# Patient Record
Sex: FEMALE | Race: WHITE | HISPANIC OR LATINO | Employment: UNEMPLOYED
[De-identification: names, ages, dates, MRNs, and addresses within clinical notes are randomized per-mention and may not be internally consistent; named-entity substitution may affect disease eponyms.]

---

## 2017-05-04 ENCOUNTER — HOSPITAL (OUTPATIENT)
Dept: OTHER | Age: 32
End: 2017-05-04
Attending: EMERGENCY MEDICINE

## 2017-05-04 LAB
ALBUMIN SERPL-MCNC: 3.7 GM/DL (ref 3.6–5.1)
ALBUMIN/GLOB SERPL: 1 {RATIO} (ref 1–2.4)
ALP SERPL-CCNC: 55 UNIT/L (ref 45–117)
ALT SERPL-CCNC: 26 UNIT/L
ANALYZER ANC (IANC): NORMAL
ANION GAP SERPL CALC-SCNC: 13 MMOL/L (ref 10–20)
APTT PPP: 23 SECONDS (ref 22–30)
APTT PPP: NORMAL S
AST SERPL-CCNC: 19 UNIT/L
BASOPHILS # BLD: 0 THOUSAND/MCL (ref 0–0.3)
BASOPHILS NFR BLD: 0 %
BILIRUB SERPL-MCNC: 0.6 MG/DL (ref 0.2–1)
BUN SERPL-MCNC: 5 MG/DL (ref 6–20)
BUN/CREAT SERPL: 10 (ref 7–25)
CALCIUM SERPL-MCNC: 9.4 MG/DL (ref 8.4–10.2)
CHLORIDE: 106 MMOL/L (ref 98–107)
CO2 SERPL-SCNC: 23 MMOL/L (ref 21–32)
CREAT SERPL-MCNC: 0.5 MG/DL (ref 0.51–0.95)
DIFFERENTIAL METHOD BLD: NORMAL
EOSINOPHIL # BLD: 0.1 THOUSAND/MCL (ref 0.1–0.5)
EOSINOPHIL NFR BLD: 1 %
ERYTHROCYTE [DISTWIDTH] IN BLOOD: 14.3 % (ref 11–15)
GLOBULIN SER-MCNC: 3.7 GM/DL (ref 2–4)
GLUCOSE SERPL-MCNC: 102 MG/DL (ref 65–99)
HCG SERPL-ACNC: ABNORMAL MUNIT/ML
HEMATOCRIT: 39.6 % (ref 36–46.5)
HGB BLD-MCNC: 13.6 GM/DL (ref 12–15.5)
INR PPP: 0.9
LIPASE SERPL-CCNC: 212 UNIT/L (ref 73–393)
LYMPHOCYTES # BLD: 1.4 THOUSAND/MCL (ref 1–4.8)
LYMPHOCYTES NFR BLD: 26 %
MCH RBC QN AUTO: 30.3 PG (ref 26–34)
MCHC RBC AUTO-ENTMCNC: 34.3 GM/DL (ref 32–36.5)
MCV RBC AUTO: 88.2 FL (ref 78–100)
MONOCYTES # BLD: 0.3 THOUSAND/MCL (ref 0.3–0.9)
MONOCYTES NFR BLD: 6 %
NEUTROPHILS # BLD: 3.5 THOUSAND/MCL (ref 1.8–7.7)
NEUTROPHILS NFR BLD: 67 %
NEUTS SEG NFR BLD: NORMAL %
PERCENT NRBC: NORMAL
PLATELET # BLD: 238 THOUSAND/MCL (ref 140–450)
POTASSIUM SERPL-SCNC: 3.6 MMOL/L (ref 3.4–5.1)
PROT SERPL-MCNC: 7.4 GM/DL (ref 6.4–8.2)
PROTHROMBIN TIME: 9.9 SECONDS (ref 9.7–11.8)
PROTHROMBIN TIME: NORMAL
RBC # BLD: 4.49 MILLION/MCL (ref 4–5.2)
SODIUM SERPL-SCNC: 138 MMOL/L (ref 135–145)
WBC # BLD: 5.3 THOUSAND/MCL (ref 4.2–11)

## 2020-01-15 LAB
CYTOLOGY CVX/VAG DOC THIN PREP: NORMAL
HPV16+18+45 E6+E7MRNA CVX NAA+PROBE: NEGATIVE

## 2021-01-07 ENCOUNTER — WALK IN (OUTPATIENT)
Dept: URGENT CARE | Age: 36
End: 2021-01-07

## 2021-01-07 ENCOUNTER — TELEPHONE (OUTPATIENT)
Dept: SCHEDULING | Age: 36
End: 2021-01-07

## 2021-01-07 VITALS
RESPIRATION RATE: 18 BRPM | DIASTOLIC BLOOD PRESSURE: 62 MMHG | HEART RATE: 80 BPM | TEMPERATURE: 99.7 F | SYSTOLIC BLOOD PRESSURE: 110 MMHG | OXYGEN SATURATION: 98 %

## 2021-01-07 DIAGNOSIS — J06.9 ACUTE UPPER RESPIRATORY INFECTION, UNSPECIFIED: ICD-10-CM

## 2021-01-07 DIAGNOSIS — Z20.822 SUSPECTED COVID-19 VIRUS INFECTION: Primary | ICD-10-CM

## 2021-01-07 LAB — SARS-COV-2 AG RESP QL IA.RAPID: NOT DETECTED

## 2021-01-07 PROCEDURE — 87426 SARSCOV CORONAVIRUS AG IA: CPT | Performed by: INTERNAL MEDICINE

## 2021-01-07 PROCEDURE — 99203 OFFICE O/P NEW LOW 30 MIN: CPT | Performed by: INTERNAL MEDICINE

## 2021-01-07 RX ORDER — CEFDINIR 300 MG/1
300 CAPSULE ORAL 2 TIMES DAILY
Qty: 20 CAPSULE | Refills: 0 | Status: SHIPPED | OUTPATIENT
Start: 2021-01-07 | End: 2021-01-17

## 2021-02-02 ENCOUNTER — OFFICE VISIT (OUTPATIENT)
Dept: FAMILY MEDICINE | Age: 36
End: 2021-02-02

## 2021-02-02 VITALS
HEIGHT: 65 IN | DIASTOLIC BLOOD PRESSURE: 60 MMHG | TEMPERATURE: 98.1 F | BODY MASS INDEX: 28.32 KG/M2 | HEART RATE: 84 BPM | RESPIRATION RATE: 16 BRPM | WEIGHT: 170 LBS | SYSTOLIC BLOOD PRESSURE: 100 MMHG

## 2021-02-02 DIAGNOSIS — R79.89 ELEVATED LFTS: ICD-10-CM

## 2021-02-02 DIAGNOSIS — L50.9 HIVES: ICD-10-CM

## 2021-02-02 DIAGNOSIS — F10.90 HEAVY ALCOHOL USE: ICD-10-CM

## 2021-02-02 DIAGNOSIS — K21.9 GASTROESOPHAGEAL REFLUX DISEASE, UNSPECIFIED WHETHER ESOPHAGITIS PRESENT: ICD-10-CM

## 2021-02-02 DIAGNOSIS — J30.89 NON-SEASONAL ALLERGIC RHINITIS, UNSPECIFIED TRIGGER: ICD-10-CM

## 2021-02-02 DIAGNOSIS — K63.5 POLYP OF COLON, UNSPECIFIED PART OF COLON, UNSPECIFIED TYPE: ICD-10-CM

## 2021-02-02 DIAGNOSIS — F41.1 GAD (GENERALIZED ANXIETY DISORDER): ICD-10-CM

## 2021-02-02 DIAGNOSIS — Z00.00 LABORATORY EXAMINATION ORDERED AS PART OF A ROUTINE GENERAL MEDICAL EXAMINATION: ICD-10-CM

## 2021-02-02 DIAGNOSIS — Z00.01 ENCOUNTER FOR GENERAL ADULT MEDICAL EXAMINATION WITH ABNORMAL FINDINGS: Primary | ICD-10-CM

## 2021-02-02 PROCEDURE — 99204 OFFICE O/P NEW MOD 45 MIN: CPT | Performed by: FAMILY MEDICINE

## 2021-02-02 PROCEDURE — 96127 BRIEF EMOTIONAL/BEHAV ASSMT: CPT | Performed by: FAMILY MEDICINE

## 2021-02-02 PROCEDURE — 99385 PREV VISIT NEW AGE 18-39: CPT | Performed by: FAMILY MEDICINE

## 2021-02-02 RX ORDER — FLUTICASONE PROPIONATE 50 MCG
SPRAY, SUSPENSION (ML) NASAL
Qty: 1 BOTTLE | Refills: 2 | Status: SHIPPED | OUTPATIENT
Start: 2021-02-02

## 2021-02-02 ASSESSMENT — PATIENT HEALTH QUESTIONNAIRE - PHQ9
CLINICAL INTERPRETATION OF PHQ2 SCORE: NO FURTHER SCREENING NEEDED
2. FEELING DOWN, DEPRESSED OR HOPELESS: NOT AT ALL
1. LITTLE INTEREST OR PLEASURE IN DOING THINGS: NOT AT ALL
SUM OF ALL RESPONSES TO PHQ9 QUESTIONS 1 AND 2: 0
SUM OF ALL RESPONSES TO PHQ9 QUESTIONS 1 AND 2: 0
CLINICAL INTERPRETATION OF PHQ9 SCORE: NO FURTHER SCREENING NEEDED

## 2021-02-02 ASSESSMENT — ANXIETY QUESTIONNAIRES
5. BEING SO RESTLESS THAT IT IS HARD TO SIT STILL: 1
3. WORRYING TOO MUCH ABOUT DIFFERENT THINGS: 1
6. BECOMING EASILY ANNOYED OR IRRITABLE: MORE THAN HALF THE DAYS
7. FEELING AFRAID AS IF SOMETHING AWFUL MIGHT HAPPEN: NOT AT ALL
4. TROUBLE RELAXING: 2
4. TROUBLE RELAXING: MORE THAN HALF THE DAYS
7. FEELING AFRAID AS IF SOMETHING AWFUL MIGHT HAPPEN: 0
1. FEELING NERVOUS, ANXIOUS, OR ON EDGE: SEVERAL DAYS
6. BECOMING EASILY ANNOYED OR IRRITABLE: 2
5. BEING SO RESTLESS THAT IT IS HARD TO SIT STILL: SEVERAL DAYS
1. FEELING NERVOUS, ANXIOUS, OR ON EDGE: 1
2. NOT BEING ABLE TO STOP OR CONTROL WORRYING: SEVERAL DAYS
3. WORRYING TOO MUCH ABOUT DIFFERENT THINGS: SEVERAL DAYS
GAD7 TOTAL SCORE: 8
2. NOT BEING ABLE TO STOP OR CONTROL WORRYING: 1

## 2021-02-09 ENCOUNTER — TELEPHONE (OUTPATIENT)
Dept: FAMILY MEDICINE | Age: 36
End: 2021-02-09

## 2021-02-09 DIAGNOSIS — R79.89 LOW VITAMIN D LEVEL: Primary | ICD-10-CM

## 2021-02-25 ENCOUNTER — OFFICE VISIT (OUTPATIENT)
Dept: ALLERGY | Age: 36
End: 2021-02-25

## 2021-02-25 ENCOUNTER — LAB SERVICES (OUTPATIENT)
Dept: LAB | Age: 36
End: 2021-02-25

## 2021-02-25 VITALS
WEIGHT: 170 LBS | TEMPERATURE: 97.2 F | HEART RATE: 72 BPM | BODY MASS INDEX: 28.32 KG/M2 | DIASTOLIC BLOOD PRESSURE: 60 MMHG | SYSTOLIC BLOOD PRESSURE: 110 MMHG | HEIGHT: 65 IN

## 2021-02-25 DIAGNOSIS — Z91.018 FOOD ALLERGY: ICD-10-CM

## 2021-02-25 DIAGNOSIS — Z91.018 FOOD ALLERGY: Primary | ICD-10-CM

## 2021-02-25 DIAGNOSIS — H10.10 ALLERGIC RHINOCONJUNCTIVITIS: ICD-10-CM

## 2021-02-25 DIAGNOSIS — J30.9 ALLERGIC RHINOCONJUNCTIVITIS: ICD-10-CM

## 2021-02-25 PROCEDURE — 36415 COLL VENOUS BLD VENIPUNCTURE: CPT | Performed by: NURSE PRACTITIONER

## 2021-02-25 PROCEDURE — 99244 OFF/OP CNSLTJ NEW/EST MOD 40: CPT | Performed by: NURSE PRACTITIONER

## 2021-02-25 PROCEDURE — 82785 ASSAY OF IGE: CPT | Performed by: NURSE PRACTITIONER

## 2021-02-25 PROCEDURE — 95004 PERQ TESTS W/ALRGNC XTRCS: CPT | Performed by: NURSE PRACTITIONER

## 2021-02-25 PROCEDURE — 86003 ALLG SPEC IGE CRUDE XTRC EA: CPT | Performed by: NURSE PRACTITIONER

## 2021-02-25 PROCEDURE — 86008 ALLG SPEC IGE RECOMB EA: CPT | Performed by: NURSE PRACTITIONER

## 2021-02-25 RX ORDER — EPINEPHRINE 0.3 MG/.3ML
0.3 INJECTION SUBCUTANEOUS PRN
Qty: 2 EACH | Refills: 0 | Status: SHIPPED | OUTPATIENT
Start: 2021-02-25

## 2021-02-25 ASSESSMENT — ENCOUNTER SYMPTOMS
SHORTNESS OF BREATH: 0
SINUS PRESSURE: 0
SLEEP DISTURBANCE: 0
APPETITE CHANGE: 0
NERVOUS/ANXIOUS: 0
HEADACHES: 0
ABDOMINAL PAIN: 0
WHEEZING: 0
DIARRHEA: 0
CHEST TIGHTNESS: 0
FACIAL SWELLING: 0
FEVER: 0
COUGH: 0
VOMITING: 0
SORE THROAT: 0
ADENOPATHY: 0

## 2021-02-27 LAB
ALMOND IGE QN: 1.29 KU/L (ref 0–0.1)
BRAZIL NUT IGE QN: <0.1 KU/L (ref 0–0.1)
CASHEW NUT IGE QN: <0.1 KU/L (ref 0–0.1)
F425 COR A 8: 29.1 KU/L (ref 0–0.1)
F428 COR A 1: <0.1 KU/L (ref 0–0.1)
F439 COR A 14: <0.1 KU/L (ref 0–0.1)
F440 COR A 9: <0.1 KU/L (ref 0–0.1)
HAZELNUT IGE QN: 14.6 KU/L (ref 0–0.1)
MACADAMIA IGE QN: 9.69 KU/L (ref 0–0.1)
PEANUT (RARA H) 1 IGE QN: <0.1 KU/L (ref 0–0.1)
PEANUT (RARA H) 2 IGE QN: <0.1 KU/L (ref 0–0.1)
PEANUT (RARA H) 3 IGE QN: <0.1 KU/L (ref 0–0.1)
PEANUT (RARA H) 8 IGE QN: <0.1 KU/L (ref 0–0.1)
PEANUT (RARA H) 9 IGE QN: 5.68 KU/L (ref 0–0.1)
PEANUT (RARA H) 9 IGE QN: <0.1 KU/L (ref 0–0.1)
PEANUT IGE QN: 6.93 KU/L (ref 0–0.1)
PECAN/HICK NUT IGE QN: 0.32 KU/L (ref 0–0.1)
PINE NUT IGE QN: <0.1 KU/L (ref 0–0.1)
PISTACHIO IGE QN: <0.1 KU/L (ref 0–0.1)
TOTAL IGE SMQN RAST: 327 KU/L (ref 0–100)
WALNUT IGE QN: 14 KU/L (ref 0–0.1)

## 2021-03-01 ENCOUNTER — TELEPHONE (OUTPATIENT)
Dept: BEHAVIORAL HEALTH | Age: 36
End: 2021-03-01

## 2021-03-08 ENCOUNTER — APPOINTMENT (OUTPATIENT)
Dept: FAMILY MEDICINE | Age: 36
End: 2021-03-08

## 2021-04-14 ENCOUNTER — V-VISIT (OUTPATIENT)
Dept: BEHAVIORAL HEALTH | Age: 36
End: 2021-04-14
Attending: FAMILY MEDICINE

## 2021-04-14 DIAGNOSIS — F41.1 GENERALIZED ANXIETY DISORDER: Primary | ICD-10-CM

## 2021-04-14 DIAGNOSIS — F33.1 MODERATE EPISODE OF RECURRENT MAJOR DEPRESSIVE DISORDER (CMD): ICD-10-CM

## 2021-04-14 DIAGNOSIS — F10.20 ALCOHOL USE DISORDER, MODERATE, DEPENDENCE (CMD): ICD-10-CM

## 2021-04-14 PROCEDURE — 90791 PSYCH DIAGNOSTIC EVALUATION: CPT | Performed by: SOCIAL WORKER

## 2021-04-21 ENCOUNTER — LAB SERVICES (OUTPATIENT)
Dept: LAB | Age: 36
End: 2021-04-21

## 2021-04-21 ENCOUNTER — E-ADVICE (OUTPATIENT)
Dept: FAMILY MEDICINE | Age: 36
End: 2021-04-21

## 2021-04-21 DIAGNOSIS — Z97.3 WEARS GLASSES: Primary | ICD-10-CM

## 2021-04-21 DIAGNOSIS — Z00.00 LABORATORY EXAMINATION ORDERED AS PART OF A ROUTINE GENERAL MEDICAL EXAMINATION: ICD-10-CM

## 2021-04-21 DIAGNOSIS — R79.89 LOW VITAMIN D LEVEL: ICD-10-CM

## 2021-04-21 LAB
25(OH)D3 SERPL-MCNC: 17.1 NG/ML (ref 30–100)
ALBUMIN SERPL-MCNC: 4.4 G/DL (ref 3.6–5.1)
ALP SERPL-CCNC: 66 U/L (ref 45–130)
ALT SERPL W/O P-5'-P-CCNC: 59 U/L (ref 4–38)
AST SERPL-CCNC: 40 U/L (ref 14–43)
BASOPHIL %: 0.6 % (ref 0–1.2)
BASOPHIL ABSOLUTE #: 0 10*3/UL (ref 0–0.1)
BILIRUB SERPL-MCNC: 0.8 MG/DL (ref 0–1.3)
BUN SERPL-MCNC: 10 MG/DL (ref 7–20)
CALCIUM SERPL-MCNC: 9.6 MG/DL (ref 8.6–10.6)
CHLORIDE SERPL-SCNC: 104 MMOL/L (ref 96–107)
CHOLEST SERPL-MCNC: 151 MG/DL (ref 140–200)
CO2 SERPL-SCNC: 27 MMOL/L (ref 22–32)
CREAT SERPL-MCNC: 0.8 MG/DL (ref 0.5–1.4)
DIFFERENTIAL TYPE: ABNORMAL
EOSINOPHIL %: 6.5 % (ref 0–10)
EOSINOPHIL ABSOLUTE #: 0.4 10*3/UL (ref 0–0.5)
GFR SERPL CREATININE-BSD FRML MDRD: >60 ML/MIN/{1.73M2}
GFR SERPL CREATININE-BSD FRML MDRD: >60 ML/MIN/{1.73M2}
GLUCOSE P FAST SERPL-MCNC: 90 MG/DL (ref 60–100)
HDLC SERPL-MCNC: 66 MG/DL
HEMATOCRIT: 45 % (ref 34–45)
HEMOGLOBIN: 14.6 G/DL (ref 11.2–15.7)
IMMATURE GRANULOCYTE ABSOLUTE: 0.02 10*3/UL (ref 0–0.05)
IMMATURE GRANULOCYTE PERCENT: 0.3 % (ref 0–0.5)
LDLC SERPL CALC-MCNC: 70 MG/DL (ref 30–100)
LYMPH PERCENT: 30 % (ref 20.5–51.1)
LYMPHOCYTE ABSOLUTE #: 1.9 10*3/UL (ref 1.2–3.4)
MEAN CORPUSCULAR HGB CONCENTRATION: 32.4 % (ref 32–36)
MEAN CORPUSCULAR HGB: 31.9 PG (ref 27–34)
MEAN CORPUSCULAR VOLUME: 98.3 FL (ref 79–95)
MEAN PLATELET VOLUME: 11.7 FL (ref 8.6–12.4)
MONOCYTE ABSOLUTE #: 0.5 10*3/UL (ref 0.2–0.9)
MONOCYTE PERCENT: 7.5 % (ref 4.3–12.9)
NEUTROPHIL ABSOLUTE #: 3.5 10*3/UL (ref 1.4–6.5)
NEUTROPHIL PERCENT: 55.1 % (ref 34–73.5)
PLATELET COUNT: 260 10*3/UL (ref 150–400)
POTASSIUM SERPL-SCNC: 4.5 MMOL/L (ref 3.5–5.3)
PROT SERPL-MCNC: 7.2 G/DL (ref 6.4–8.5)
RED BLOOD CELL COUNT: 4.58 10*6/UL (ref 3.7–5.2)
RED CELL DISTRIBUTION WIDTH: 12.9 % (ref 11.3–14.8)
SODIUM SERPL-SCNC: 142 MMOL/L (ref 136–146)
TRIGL SERPL-MCNC: 74 MG/DL (ref 0–200)
TSH SERPL DL<=0.05 MIU/L-ACNC: 2.14 M[IU]/L (ref 0.3–4.82)
WHITE BLOOD CELL COUNT: 6.3 10*3/UL (ref 4–10)

## 2021-04-21 PROCEDURE — 82306 VITAMIN D 25 HYDROXY: CPT | Performed by: FAMILY MEDICINE

## 2021-04-21 PROCEDURE — 80053 COMPREHEN METABOLIC PANEL: CPT | Performed by: FAMILY MEDICINE

## 2021-04-21 PROCEDURE — 84443 ASSAY THYROID STIM HORMONE: CPT | Performed by: FAMILY MEDICINE

## 2021-04-21 PROCEDURE — 85025 COMPLETE CBC W/AUTO DIFF WBC: CPT | Performed by: FAMILY MEDICINE

## 2021-04-21 PROCEDURE — 80061 LIPID PANEL: CPT | Performed by: FAMILY MEDICINE

## 2021-04-21 PROCEDURE — 36415 COLL VENOUS BLD VENIPUNCTURE: CPT | Performed by: FAMILY MEDICINE

## 2021-04-26 ENCOUNTER — APPOINTMENT (OUTPATIENT)
Dept: BEHAVIORAL HEALTH | Age: 36
End: 2021-04-26

## 2021-05-07 ENCOUNTER — TELEPHONE (OUTPATIENT)
Dept: BEHAVIORAL HEALTH | Age: 36
End: 2021-05-07

## 2021-05-07 ENCOUNTER — APPOINTMENT (OUTPATIENT)
Dept: BEHAVIORAL HEALTH | Age: 36
End: 2021-05-07

## 2021-05-11 ENCOUNTER — V-VISIT (OUTPATIENT)
Dept: BEHAVIORAL HEALTH | Age: 36
End: 2021-05-11

## 2021-05-11 ENCOUNTER — TELEPHONE (OUTPATIENT)
Dept: BEHAVIORAL HEALTH | Age: 36
End: 2021-05-11

## 2021-05-11 DIAGNOSIS — F10.20 ALCOHOL USE DISORDER, MODERATE, DEPENDENCE (CMD): ICD-10-CM

## 2021-05-11 DIAGNOSIS — F41.1 GENERALIZED ANXIETY DISORDER: Primary | ICD-10-CM

## 2021-05-11 DIAGNOSIS — F33.1 MODERATE EPISODE OF RECURRENT MAJOR DEPRESSIVE DISORDER (CMD): ICD-10-CM

## 2021-05-11 PROCEDURE — 90837 PSYTX W PT 60 MINUTES: CPT | Performed by: SOCIAL WORKER

## 2021-05-18 ENCOUNTER — APPOINTMENT (OUTPATIENT)
Dept: GASTROENTEROLOGY | Age: 36
End: 2021-05-18
Attending: FAMILY MEDICINE

## 2021-05-21 ENCOUNTER — APPOINTMENT (OUTPATIENT)
Dept: GASTROENTEROLOGY | Age: 36
End: 2021-05-21
Attending: FAMILY MEDICINE

## 2021-05-24 ENCOUNTER — BEHAVIORAL HEALTH (OUTPATIENT)
Dept: BEHAVIORAL HEALTH | Age: 36
End: 2021-05-24

## 2021-05-24 DIAGNOSIS — F33.1 MODERATE EPISODE OF RECURRENT MAJOR DEPRESSIVE DISORDER (CMD): ICD-10-CM

## 2021-05-24 DIAGNOSIS — F10.20 ALCOHOL USE DISORDER, MODERATE, DEPENDENCE (CMD): ICD-10-CM

## 2021-05-24 DIAGNOSIS — F41.1 GENERALIZED ANXIETY DISORDER: Primary | ICD-10-CM

## 2021-05-24 PROCEDURE — 90837 PSYTX W PT 60 MINUTES: CPT | Performed by: SOCIAL WORKER

## 2021-06-07 ENCOUNTER — APPOINTMENT (OUTPATIENT)
Dept: BEHAVIORAL HEALTH | Age: 36
End: 2021-06-07

## 2021-06-16 ENCOUNTER — APPOINTMENT (OUTPATIENT)
Dept: OPTOMETRY | Age: 36
End: 2021-06-16
Attending: FAMILY MEDICINE

## 2021-06-16 ENCOUNTER — OFFICE VISIT (OUTPATIENT)
Dept: OPHTHALMOLOGY | Age: 36
End: 2021-06-16

## 2021-06-16 ENCOUNTER — OFFICE VISIT (OUTPATIENT)
Dept: OPTOMETRY | Age: 36
End: 2021-06-16

## 2021-06-16 DIAGNOSIS — R68.89 SUSPECTED GLAUCOMA OF BOTH EYES: ICD-10-CM

## 2021-06-16 DIAGNOSIS — H40.003 PREGLAUCOMA, BILATERAL: ICD-10-CM

## 2021-06-16 DIAGNOSIS — H52.13 MYOPIA OF BOTH EYES: Primary | ICD-10-CM

## 2021-06-16 PROCEDURE — 92015 DETERMINE REFRACTIVE STATE: CPT | Performed by: OPTOMETRIST

## 2021-06-16 PROCEDURE — 92133 CPTRZD OPH DX IMG PST SGM ON: CPT | Performed by: OPTOMETRIST

## 2021-06-16 PROCEDURE — 92004 COMPRE OPH EXAM NEW PT 1/>: CPT | Performed by: OPTOMETRIST

## 2021-06-16 ASSESSMENT — EXTERNAL EXAM - RIGHT EYE: OD_EXAM: NORMAL

## 2021-06-16 ASSESSMENT — VISUAL ACUITY
OD_SC: 20/20
OS_SC: 20/20
OD_SC: JAEGER 1
METHOD: SNELLEN - LINEAR

## 2021-06-16 ASSESSMENT — REFRACTION_MANIFEST
OS_CYLINDER: SPHERE
OD_CYLINDER: SPHERE
OS_SPHERE: -1.00
OD_SPHERE: -1.00

## 2021-06-16 ASSESSMENT — TONOMETRY
OS_IOP_MMHG: 21
OD_IOP_MMHG: 22
IOP_METHOD: APPLANATION

## 2021-06-16 ASSESSMENT — CONF VISUAL FIELD
OS_NORMAL: 1
OD_NORMAL: 1

## 2021-06-16 ASSESSMENT — SLIT LAMP EXAM - LIDS
COMMENTS: NORMAL
COMMENTS: NORMAL

## 2021-06-16 ASSESSMENT — CUP TO DISC RATIO
OD_RATIO: 0.4
OS_RATIO: 0.4

## 2021-06-16 ASSESSMENT — EXTERNAL EXAM - LEFT EYE: OS_EXAM: NORMAL

## 2021-07-06 ENCOUNTER — TELEPHONE (OUTPATIENT)
Dept: OPTOMETRY | Age: 36
End: 2021-07-06

## 2021-07-07 ENCOUNTER — OFFICE VISIT (OUTPATIENT)
Dept: GASTROENTEROLOGY | Age: 36
End: 2021-07-07
Attending: FAMILY MEDICINE

## 2021-07-07 VITALS — WEIGHT: 170 LBS | BODY MASS INDEX: 28.32 KG/M2 | HEIGHT: 65 IN

## 2021-07-07 DIAGNOSIS — K21.9 GASTROESOPHAGEAL REFLUX DISEASE, UNSPECIFIED WHETHER ESOPHAGITIS PRESENT: Primary | ICD-10-CM

## 2021-07-07 DIAGNOSIS — Z86.010 PERSONAL HISTORY OF COLONIC POLYPS: ICD-10-CM

## 2021-07-07 DIAGNOSIS — R79.89 ELEVATED LFTS: ICD-10-CM

## 2021-07-07 DIAGNOSIS — K63.5 POLYP OF COLON, UNSPECIFIED PART OF COLON, UNSPECIFIED TYPE: ICD-10-CM

## 2021-07-07 DIAGNOSIS — K59.00 CONSTIPATION, UNSPECIFIED CONSTIPATION TYPE: ICD-10-CM

## 2021-07-07 DIAGNOSIS — K62.5 RECTUM BLEEDING: ICD-10-CM

## 2021-07-07 PROCEDURE — 99244 OFF/OP CNSLTJ NEW/EST MOD 40: CPT | Performed by: INTERNAL MEDICINE

## 2021-07-07 RX ORDER — SIMETHICONE 125 MG
TABLET,CHEWABLE ORAL
Qty: 2 TABLET | Refills: 0 | Status: SHIPPED | OUTPATIENT
Start: 2021-07-07 | End: 2021-08-21

## 2021-07-07 RX ORDER — BISACODYL 5 MG/1
TABLET, DELAYED RELEASE ORAL
Qty: 2 TABLET | Refills: 0 | Status: SHIPPED | OUTPATIENT
Start: 2021-07-07 | End: 2021-08-21

## 2021-07-07 RX ORDER — SODIUM, POTASSIUM,MAG SULFATES 17.5-3.13G
SOLUTION, RECONSTITUTED, ORAL ORAL
Qty: 1 KIT | Refills: 0 | Status: SHIPPED | OUTPATIENT
Start: 2021-07-07 | End: 2021-08-21

## 2021-08-08 ENCOUNTER — APPOINTMENT (OUTPATIENT)
Dept: URGENT CARE | Age: 36
End: 2021-08-08

## 2021-08-08 DIAGNOSIS — Z11.52 ENCOUNTER FOR PREPROCEDURE SCREENING LABORATORY TESTING FOR COVID-19: Primary | ICD-10-CM

## 2021-08-08 DIAGNOSIS — Z01.812 ENCOUNTER FOR PREPROCEDURE SCREENING LABORATORY TESTING FOR COVID-19: Primary | ICD-10-CM

## 2021-08-08 PROCEDURE — U0003 INFECTIOUS AGENT DETECTION BY NUCLEIC ACID (DNA OR RNA); SEVERE ACUTE RESPIRATORY SYNDROME CORONAVIRUS 2 (SARS-COV-2) (CORONAVIRUS DISEASE [COVID-19]), AMPLIFIED PROBE TECHNIQUE, MAKING USE OF HIGH THROUGHPUT TECHNOLOGIES AS DESCRIBED BY CMS-2020-01-R: HCPCS | Performed by: INTERNAL MEDICINE

## 2021-08-08 PROCEDURE — U0005 INFEC AGEN DETEC AMPLI PROBE: HCPCS | Performed by: INTERNAL MEDICINE

## 2021-08-09 LAB
SARS-COV-2 RNA RESP QL NAA+PROBE: NOT DETECTED
SERVICE CMNT-IMP: NORMAL
SERVICE CMNT-IMP: NORMAL

## 2021-08-10 ENCOUNTER — ANESTHESIA (OUTPATIENT)
Dept: GASTROENTEROLOGY | Age: 36
End: 2021-08-10

## 2021-08-10 ENCOUNTER — HOSPITAL ENCOUNTER (OUTPATIENT)
Dept: GASTROENTEROLOGY | Age: 36
Discharge: HOME | End: 2021-08-10
Attending: INTERNAL MEDICINE

## 2021-08-10 ENCOUNTER — ANESTHESIA EVENT (OUTPATIENT)
Dept: GASTROENTEROLOGY | Age: 36
End: 2021-08-10

## 2021-08-10 VITALS
HEART RATE: 72 BPM | HEIGHT: 65 IN | SYSTOLIC BLOOD PRESSURE: 125 MMHG | OXYGEN SATURATION: 97 % | DIASTOLIC BLOOD PRESSURE: 65 MMHG | BODY MASS INDEX: 28.32 KG/M2 | WEIGHT: 170 LBS | RESPIRATION RATE: 16 BRPM | TEMPERATURE: 97.5 F

## 2021-08-10 DIAGNOSIS — K31.89 OTHER DISEASES OF STOMACH AND DUODENUM: ICD-10-CM

## 2021-08-10 DIAGNOSIS — K21.9 GASTROESOPHAGEAL REFLUX DISEASE, UNSPECIFIED WHETHER ESOPHAGITIS PRESENT: Primary | ICD-10-CM

## 2021-08-10 DIAGNOSIS — K52.89 OTHER SPECIFIED NONINFECTIVE GASTROENTERITIS AND COLITIS: ICD-10-CM

## 2021-08-10 DIAGNOSIS — K62.5 HEMORRHAGE OF ANUS AND RECTUM: ICD-10-CM

## 2021-08-10 DIAGNOSIS — K62.5 RECTUM BLEEDING: ICD-10-CM

## 2021-08-10 DIAGNOSIS — D12.5 BENIGN NEOPLASM OF SIGMOID COLON: ICD-10-CM

## 2021-08-10 DIAGNOSIS — Z86.010 PERSONAL HISTORY OF COLONIC POLYPS: ICD-10-CM

## 2021-08-10 DIAGNOSIS — K21.9 GASTRO-ESOPHAGEAL REFLUX DISEASE WITHOUT ESOPHAGITIS: ICD-10-CM

## 2021-08-10 DIAGNOSIS — K20.80 OTHER ESOPHAGITIS WITHOUT BLEEDING: ICD-10-CM

## 2021-08-10 LAB
B-HCG UR QL: NEGATIVE
COLONOSCOPY STUDY: NORMAL
INTERNAL PROCEDURAL CONTROLS ACCEPTABLE: YES

## 2021-08-10 PROCEDURE — 88342 IMHCHEM/IMCYTCHM 1ST ANTB: CPT | Performed by: PATHOLOGY

## 2021-08-10 PROCEDURE — 45380 COLONOSCOPY AND BIOPSY: CPT | Performed by: CLINIC/CENTER

## 2021-08-10 PROCEDURE — 45385 COLONOSCOPY W/LESION REMOVAL: CPT | Performed by: INTERNAL MEDICINE

## 2021-08-10 PROCEDURE — 88342 IMHCHEM/IMCYTCHM 1ST ANTB: CPT | Performed by: CLINICAL MEDICAL LABORATORY

## 2021-08-10 PROCEDURE — 45380 COLONOSCOPY AND BIOPSY: CPT | Performed by: INTERNAL MEDICINE

## 2021-08-10 PROCEDURE — 43239 EGD BIOPSY SINGLE/MULTIPLE: CPT | Performed by: INTERNAL MEDICINE

## 2021-08-10 PROCEDURE — 45385 COLONOSCOPY W/LESION REMOVAL: CPT | Performed by: CLINIC/CENTER

## 2021-08-10 PROCEDURE — 43239 EGD BIOPSY SINGLE/MULTIPLE: CPT | Performed by: CLINIC/CENTER

## 2021-08-10 PROCEDURE — 88305 TISSUE EXAM BY PATHOLOGIST: CPT | Performed by: CLINICAL MEDICAL LABORATORY

## 2021-08-10 PROCEDURE — 88305 TISSUE EXAM BY PATHOLOGIST: CPT | Performed by: PATHOLOGY

## 2021-08-10 RX ORDER — SODIUM CHLORIDE, SODIUM LACTATE, POTASSIUM CHLORIDE, CALCIUM CHLORIDE 600; 310; 30; 20 MG/100ML; MG/100ML; MG/100ML; MG/100ML
INJECTION, SOLUTION INTRAVENOUS CONTINUOUS
Status: DISCONTINUED | OUTPATIENT
Start: 2021-08-10 | End: 2021-08-12 | Stop reason: HOSPADM

## 2021-08-10 RX ORDER — LIDOCAINE HYDROCHLORIDE 10 MG/ML
INJECTION, SOLUTION INFILTRATION; PERINEURAL PRN
Status: DISCONTINUED | OUTPATIENT
Start: 2021-08-10 | End: 2021-08-10

## 2021-08-10 RX ORDER — SODIUM CHLORIDE 9 MG/ML
INJECTION, SOLUTION INTRAVENOUS CONTINUOUS
Status: DISCONTINUED | OUTPATIENT
Start: 2021-08-10 | End: 2021-08-12 | Stop reason: HOSPADM

## 2021-08-10 RX ORDER — PROPOFOL 10 MG/ML
INJECTION, EMULSION INTRAVENOUS PRN
Status: DISCONTINUED | OUTPATIENT
Start: 2021-08-10 | End: 2021-08-10

## 2021-08-10 RX ORDER — LIDOCAINE HYDROCHLORIDE 10 MG/ML
5-10 INJECTION, SOLUTION INFILTRATION; PERINEURAL PRN
Status: DISCONTINUED | OUTPATIENT
Start: 2021-08-10 | End: 2021-08-12 | Stop reason: HOSPADM

## 2021-08-10 RX ORDER — ONDANSETRON 2 MG/ML
4 INJECTION INTRAMUSCULAR; INTRAVENOUS 2 TIMES DAILY PRN
Status: DISCONTINUED | OUTPATIENT
Start: 2021-08-10 | End: 2021-08-12 | Stop reason: HOSPADM

## 2021-08-10 RX ORDER — DEXTROSE MONOHYDRATE 50 MG/ML
INJECTION, SOLUTION INTRAVENOUS CONTINUOUS PRN
Status: DISCONTINUED | OUTPATIENT
Start: 2021-08-10 | End: 2021-08-12 | Stop reason: HOSPADM

## 2021-08-10 RX ADMIN — SODIUM CHLORIDE, SODIUM LACTATE, POTASSIUM CHLORIDE, CALCIUM CHLORIDE: 600; 310; 30; 20 INJECTION, SOLUTION INTRAVENOUS at 13:54

## 2021-08-10 RX ADMIN — PROPOFOL 50 MG: 10 INJECTION, EMULSION INTRAVENOUS at 14:13

## 2021-08-10 RX ADMIN — PROPOFOL 50 MG: 10 INJECTION, EMULSION INTRAVENOUS at 14:16

## 2021-08-10 RX ADMIN — PROPOFOL 100 MG: 10 INJECTION, EMULSION INTRAVENOUS at 14:11

## 2021-08-10 RX ADMIN — LIDOCAINE HYDROCHLORIDE 50 MG: 10 INJECTION, SOLUTION INFILTRATION; PERINEURAL at 14:11

## 2021-08-10 RX ADMIN — PROPOFOL 50 MG: 10 INJECTION, EMULSION INTRAVENOUS at 14:18

## 2021-08-10 ASSESSMENT — ACTIVITIES OF DAILY LIVING (ADL)
ADL_SCORE: 12
ADL_BEFORE_ADMISSION: INDEPENDENT
NEEDS_ASSIST: NO
HISTORY OF FALLING IN THE LAST YEAR (PRIOR TO ADMISSION): NO

## 2021-08-10 ASSESSMENT — PAIN SCALES - GENERAL: PAINLEVEL_OUTOF10: 0

## 2021-08-10 ASSESSMENT — ENCOUNTER SYMPTOMS: HEADACHES: 1

## 2021-08-11 ENCOUNTER — LAB REQUISITION (OUTPATIENT)
Dept: LAB | Age: 36
End: 2021-08-11

## 2021-08-11 DIAGNOSIS — Z86.010 PERSONAL HISTORY OF COLONIC POLYPS: ICD-10-CM

## 2021-08-11 DIAGNOSIS — K62.5 HEMORRHAGE OF ANUS AND RECTUM: ICD-10-CM

## 2021-08-11 DIAGNOSIS — K21.9 GASTRO-ESOPHAGEAL REFLUX DISEASE WITHOUT ESOPHAGITIS: ICD-10-CM

## 2021-08-14 LAB
CASE RPRT: NORMAL
PATH REPORT.FINAL DX SPEC: NORMAL

## 2021-08-16 LAB — AP REPORT: NORMAL

## 2021-08-17 RX ORDER — HYDROCORTISONE ACETATE 25 MG/1
25 SUPPOSITORY RECTAL NIGHTLY
Qty: 30 SUPPOSITORY | Refills: 0 | Status: SHIPPED | OUTPATIENT
Start: 2021-08-17 | End: 2021-09-16

## 2021-08-20 ENCOUNTER — WALK IN (OUTPATIENT)
Dept: URGENT CARE | Age: 36
End: 2021-08-20

## 2021-08-20 VITALS
SYSTOLIC BLOOD PRESSURE: 110 MMHG | TEMPERATURE: 97.6 F | DIASTOLIC BLOOD PRESSURE: 70 MMHG | OXYGEN SATURATION: 97 % | RESPIRATION RATE: 18 BRPM | HEART RATE: 67 BPM

## 2021-08-20 DIAGNOSIS — Z20.822 SUSPECTED COVID-19 VIRUS INFECTION: Primary | ICD-10-CM

## 2021-08-20 LAB — SARS-COV+SARS-COV-2 AG RESP QL IA.RAPID: NOT DETECTED

## 2021-08-20 PROCEDURE — 87426 SARSCOV CORONAVIRUS AG IA: CPT | Performed by: INTERNAL MEDICINE

## 2021-08-20 PROCEDURE — 99214 OFFICE O/P EST MOD 30 MIN: CPT | Performed by: INTERNAL MEDICINE

## 2021-10-26 ENCOUNTER — APPOINTMENT (OUTPATIENT)
Dept: URGENT CARE | Age: 36
End: 2021-10-26

## 2021-10-27 ENCOUNTER — WALK IN (OUTPATIENT)
Dept: URGENT CARE | Age: 36
End: 2021-10-27

## 2021-10-27 VITALS
SYSTOLIC BLOOD PRESSURE: 102 MMHG | HEART RATE: 95 BPM | OXYGEN SATURATION: 97 % | DIASTOLIC BLOOD PRESSURE: 64 MMHG | RESPIRATION RATE: 14 BRPM | TEMPERATURE: 98.8 F

## 2021-10-27 DIAGNOSIS — R50.9 FEVER, UNSPECIFIED FEVER CAUSE: ICD-10-CM

## 2021-10-27 DIAGNOSIS — R05.9 COUGH: ICD-10-CM

## 2021-10-27 DIAGNOSIS — U07.1 COVID-19 VIRUS INFECTION: Primary | ICD-10-CM

## 2021-10-27 LAB — SARS-COV+SARS-COV-2 AG RESP QL IA.RAPID: DETECTED

## 2021-10-27 PROCEDURE — 87426 SARSCOV CORONAVIRUS AG IA: CPT | Performed by: FAMILY MEDICINE

## 2021-10-27 PROCEDURE — 99213 OFFICE O/P EST LOW 20 MIN: CPT | Performed by: FAMILY MEDICINE

## 2021-11-02 ENCOUNTER — E-ADVICE (OUTPATIENT)
Dept: FAMILY MEDICINE | Age: 36
End: 2021-11-02

## 2021-11-09 ENCOUNTER — WALK IN (OUTPATIENT)
Dept: URGENT CARE | Age: 36
End: 2021-11-09

## 2021-11-09 ENCOUNTER — TELEPHONE (OUTPATIENT)
Dept: FAMILY MEDICINE | Age: 36
End: 2021-11-09

## 2021-11-09 VITALS
SYSTOLIC BLOOD PRESSURE: 110 MMHG | HEART RATE: 107 BPM | OXYGEN SATURATION: 94 % | RESPIRATION RATE: 22 BRPM | TEMPERATURE: 96.8 F | DIASTOLIC BLOOD PRESSURE: 72 MMHG

## 2021-11-09 DIAGNOSIS — R06.02 SHORTNESS OF BREATH: Primary | ICD-10-CM

## 2021-11-09 PROCEDURE — 99213 OFFICE O/P EST LOW 20 MIN: CPT | Performed by: FAMILY MEDICINE

## 2021-11-09 ASSESSMENT — ENCOUNTER SYMPTOMS
SHORTNESS OF BREATH: 1
COUGH: 1

## 2022-01-06 ENCOUNTER — OFFICE VISIT (OUTPATIENT)
Dept: INTERNAL MEDICINE | Age: 37
End: 2022-01-06

## 2022-01-06 VITALS
HEART RATE: 68 BPM | TEMPERATURE: 98.5 F | RESPIRATION RATE: 16 BRPM | BODY MASS INDEX: 27.69 KG/M2 | OXYGEN SATURATION: 100 % | DIASTOLIC BLOOD PRESSURE: 76 MMHG | SYSTOLIC BLOOD PRESSURE: 108 MMHG | WEIGHT: 166.4 LBS

## 2022-01-06 DIAGNOSIS — J30.9 ALLERGIC RHINOCONJUNCTIVITIS: Primary | ICD-10-CM

## 2022-01-06 DIAGNOSIS — H10.10 ALLERGIC RHINOCONJUNCTIVITIS: Primary | ICD-10-CM

## 2022-01-06 PROCEDURE — 99213 OFFICE O/P EST LOW 20 MIN: CPT | Performed by: INTERNAL MEDICINE

## 2022-01-06 RX ORDER — LORATADINE 10 MG/1
10 TABLET ORAL DAILY
Qty: 30 TABLET | Refills: 5 | Status: SHIPPED | OUTPATIENT
Start: 2022-01-06

## 2022-01-06 RX ORDER — ALBUTEROL SULFATE 90 UG/1
AEROSOL, METERED RESPIRATORY (INHALATION)
COMMUNITY
Start: 2021-11-09

## 2022-10-05 ENCOUNTER — OFFICE VISIT (OUTPATIENT)
Dept: FAMILY MEDICINE CLINIC | Facility: CLINIC | Age: 37
End: 2022-10-05
Payer: COMMERCIAL

## 2022-10-05 VITALS
DIASTOLIC BLOOD PRESSURE: 70 MMHG | HEART RATE: 70 BPM | BODY MASS INDEX: 27.14 KG/M2 | HEIGHT: 64 IN | WEIGHT: 159 LBS | SYSTOLIC BLOOD PRESSURE: 110 MMHG | TEMPERATURE: 98 F | OXYGEN SATURATION: 98 % | RESPIRATION RATE: 18 BRPM

## 2022-10-05 DIAGNOSIS — Z00.00 ANNUAL PHYSICAL EXAM: Primary | ICD-10-CM

## 2022-10-05 DIAGNOSIS — Z91.018 TREE NUT ALLERGY: ICD-10-CM

## 2022-10-05 PROCEDURE — 99385 PREV VISIT NEW AGE 18-39: CPT | Performed by: FAMILY MEDICINE

## 2022-10-05 PROCEDURE — 3078F DIAST BP <80 MM HG: CPT | Performed by: FAMILY MEDICINE

## 2022-10-05 PROCEDURE — 3008F BODY MASS INDEX DOCD: CPT | Performed by: FAMILY MEDICINE

## 2022-10-05 PROCEDURE — 3074F SYST BP LT 130 MM HG: CPT | Performed by: FAMILY MEDICINE

## 2022-10-05 RX ORDER — EPINEPHRINE 0.3 MG/.3ML
0.3 INJECTION SUBCUTANEOUS ONCE
COMMUNITY

## 2022-10-06 ENCOUNTER — PATIENT MESSAGE (OUTPATIENT)
Dept: FAMILY MEDICINE CLINIC | Facility: CLINIC | Age: 37
End: 2022-10-06

## 2022-10-06 ENCOUNTER — NURSE ONLY (OUTPATIENT)
Dept: FAMILY MEDICINE CLINIC | Facility: CLINIC | Age: 37
End: 2022-10-06
Payer: COMMERCIAL

## 2022-10-06 DIAGNOSIS — Z00.00 ANNUAL PHYSICAL EXAM: ICD-10-CM

## 2022-10-06 DIAGNOSIS — E55.9 VITAMIN D DEFICIENCY: Primary | ICD-10-CM

## 2022-10-06 DIAGNOSIS — L91.8 SKIN TAG: ICD-10-CM

## 2022-10-06 LAB
ALBUMIN SERPL-MCNC: 4.1 G/DL (ref 3.4–5)
ALBUMIN/GLOB SERPL: 1.2 {RATIO} (ref 1–2)
ALP LIVER SERPL-CCNC: 51 U/L
ALT SERPL-CCNC: 35 U/L
ANION GAP SERPL CALC-SCNC: 6 MMOL/L (ref 0–18)
AST SERPL-CCNC: 20 U/L (ref 15–37)
BASOPHILS # BLD AUTO: 0.04 X10(3) UL (ref 0–0.2)
BASOPHILS NFR BLD AUTO: 0.8 %
BILIRUB SERPL-MCNC: 0.9 MG/DL (ref 0.1–2)
BUN BLD-MCNC: 7 MG/DL (ref 7–18)
CALCIUM BLD-MCNC: 9.4 MG/DL (ref 8.5–10.1)
CHLORIDE SERPL-SCNC: 110 MMOL/L (ref 98–112)
CHOLEST SERPL-MCNC: 127 MG/DL (ref ?–200)
CO2 SERPL-SCNC: 25 MMOL/L (ref 21–32)
CREAT BLD-MCNC: 0.77 MG/DL
EOSINOPHIL # BLD AUTO: 0.13 X10(3) UL (ref 0–0.7)
EOSINOPHIL NFR BLD AUTO: 2.7 %
ERYTHROCYTE [DISTWIDTH] IN BLOOD BY AUTOMATED COUNT: 13.2 %
EST. AVERAGE GLUCOSE BLD GHB EST-MCNC: 94 MG/DL (ref 68–126)
FASTING PATIENT LIPID ANSWER: YES
FASTING STATUS PATIENT QL REPORTED: YES
GFR SERPLBLD BASED ON 1.73 SQ M-ARVRAT: 101 ML/MIN/1.73M2 (ref 60–?)
GLOBULIN PLAS-MCNC: 3.3 G/DL (ref 2.8–4.4)
GLUCOSE BLD-MCNC: 98 MG/DL (ref 70–99)
HBA1C MFR BLD: 4.9 % (ref ?–5.7)
HCT VFR BLD AUTO: 43.8 %
HDLC SERPL-MCNC: 56 MG/DL (ref 40–59)
HGB BLD-MCNC: 14.3 G/DL
IMM GRANULOCYTES # BLD AUTO: 0.01 X10(3) UL (ref 0–1)
IMM GRANULOCYTES NFR BLD: 0.2 %
LDLC SERPL CALC-MCNC: 62 MG/DL (ref ?–100)
LYMPHOCYTES # BLD AUTO: 1.31 X10(3) UL (ref 1–4)
LYMPHOCYTES NFR BLD AUTO: 27.1 %
MCH RBC QN AUTO: 31.4 PG (ref 26–34)
MCHC RBC AUTO-ENTMCNC: 32.6 G/DL (ref 31–37)
MCV RBC AUTO: 96.3 FL
MONOCYTES # BLD AUTO: 0.46 X10(3) UL (ref 0.1–1)
MONOCYTES NFR BLD AUTO: 9.5 %
NEUTROPHILS # BLD AUTO: 2.89 X10 (3) UL (ref 1.5–7.7)
NEUTROPHILS # BLD AUTO: 2.89 X10(3) UL (ref 1.5–7.7)
NEUTROPHILS NFR BLD AUTO: 59.7 %
NONHDLC SERPL-MCNC: 71 MG/DL (ref ?–130)
OSMOLALITY SERPL CALC.SUM OF ELEC: 290 MOSM/KG (ref 275–295)
PLATELET # BLD AUTO: 230 10(3)UL (ref 150–450)
POTASSIUM SERPL-SCNC: 4.1 MMOL/L (ref 3.5–5.1)
PROT SERPL-MCNC: 7.4 G/DL (ref 6.4–8.2)
RBC # BLD AUTO: 4.55 X10(6)UL
SODIUM SERPL-SCNC: 141 MMOL/L (ref 136–145)
TRIGL SERPL-MCNC: 36 MG/DL (ref 30–149)
TSI SER-ACNC: 1 MIU/ML (ref 0.36–3.74)
VLDLC SERPL CALC-MCNC: 5 MG/DL (ref 0–30)
WBC # BLD AUTO: 4.8 X10(3) UL (ref 4–11)

## 2022-10-06 PROCEDURE — 85025 COMPLETE CBC W/AUTO DIFF WBC: CPT | Performed by: FAMILY MEDICINE

## 2022-10-06 PROCEDURE — 83036 HEMOGLOBIN GLYCOSYLATED A1C: CPT | Performed by: FAMILY MEDICINE

## 2022-10-06 PROCEDURE — 80061 LIPID PANEL: CPT | Performed by: FAMILY MEDICINE

## 2022-10-06 PROCEDURE — 80053 COMPREHEN METABOLIC PANEL: CPT | Performed by: FAMILY MEDICINE

## 2022-10-06 PROCEDURE — 84443 ASSAY THYROID STIM HORMONE: CPT | Performed by: FAMILY MEDICINE

## 2022-10-06 NOTE — PROGRESS NOTES
Pt here x fasting labs    performed by SARA Justice MA w/ no accident reported, on RT  arm. pt tolerate.

## 2022-10-28 ENCOUNTER — PATIENT MESSAGE (OUTPATIENT)
Dept: FAMILY MEDICINE CLINIC | Facility: CLINIC | Age: 37
End: 2022-10-28

## 2022-11-07 ENCOUNTER — TELEPHONE (OUTPATIENT)
Dept: FAMILY MEDICINE CLINIC | Facility: CLINIC | Age: 37
End: 2022-11-07

## 2023-01-03 ENCOUNTER — OFFICE VISIT (OUTPATIENT)
Dept: OBGYN CLINIC | Facility: CLINIC | Age: 38
End: 2023-01-03
Payer: COMMERCIAL

## 2023-01-03 VITALS
WEIGHT: 158.63 LBS | DIASTOLIC BLOOD PRESSURE: 66 MMHG | HEART RATE: 68 BPM | HEIGHT: 65 IN | BODY MASS INDEX: 26.43 KG/M2 | SYSTOLIC BLOOD PRESSURE: 104 MMHG

## 2023-01-03 DIAGNOSIS — Z01.419 WELL WOMAN EXAM WITH ROUTINE GYNECOLOGICAL EXAM: Primary | ICD-10-CM

## 2023-01-03 DIAGNOSIS — Z12.4 CERVICAL CANCER SCREENING: ICD-10-CM

## 2023-01-03 DIAGNOSIS — Z30.432 ENCOUNTER FOR REMOVAL OF INTRAUTERINE CONTRACEPTIVE DEVICE: ICD-10-CM

## 2023-01-03 PROCEDURE — 99385 PREV VISIT NEW AGE 18-39: CPT | Performed by: NURSE PRACTITIONER

## 2023-01-03 PROCEDURE — 3078F DIAST BP <80 MM HG: CPT | Performed by: NURSE PRACTITIONER

## 2023-01-03 PROCEDURE — 3008F BODY MASS INDEX DOCD: CPT | Performed by: NURSE PRACTITIONER

## 2023-01-03 PROCEDURE — 58301 REMOVE INTRAUTERINE DEVICE: CPT | Performed by: NURSE PRACTITIONER

## 2023-01-03 PROCEDURE — 3074F SYST BP LT 130 MM HG: CPT | Performed by: NURSE PRACTITIONER

## 2023-01-03 RX ORDER — MULTIVITAMIN
TABLET ORAL
COMMUNITY

## 2023-01-11 LAB — HPV I/H RISK 1 DNA SPEC QL NAA+PROBE: NEGATIVE

## 2023-08-07 DIAGNOSIS — Z91.018 NUT ALLERGY: Primary | ICD-10-CM

## 2023-08-07 RX ORDER — EPINEPHRINE 0.3 MG/.3ML
0.3 INJECTION SUBCUTANEOUS ONCE
Qty: 1 EACH | Refills: 0 | Status: SHIPPED | OUTPATIENT
Start: 2023-08-07 | End: 2023-08-07

## 2023-08-07 NOTE — TELEPHONE ENCOUNTER
Rx refill requested     EPINEPHrine 0.3 MG/0.3ML Injection Solution Auto-injector     [id number]  [address], [phone], [phone]

## 2023-08-07 NOTE — TELEPHONE ENCOUNTER
Patient paged on call service requesting refill of her Epi Pen. She is leaving for [facility]. Refill sent.

## 2023-08-07 NOTE — TELEPHONE ENCOUNTER
LOV 10/5/22 for a physical.    Medication Quantity Refills Start End   EPINEPHrine 0.3 MG/0.3ML Injection Solution Auto-injector       Sig:   Inject 0.3 mL as directed one time. Route:   Injection     Class:   Historical       No future appointments. Ok to send?

## 2023-10-13 NOTE — TELEPHONE ENCOUNTER
Patient calling to initiate prenatal care  LMP 9-  Patient is 7-8 weeks on 11-6-23  Confirmation Ultrasound and Appointment scheduled on   Future Appointments   Date Time Provider Department Center   11/9/2023 12:30 PM [facility] [place]   11/9/2023  1:00 PM Heather Mcnamara MD [facility] [place]   12/6/2023 10:00 AM Lana Begum MD [facility] [place]         Any history of ectopic pregnancy? no  Any history of miscarriage? no  Any medications that you are taking on a regular basis other than prenatal vitamins? no (if not taking prenatal vitamins, encourage patient to start taking.)  Any bleeding since the first day of last LMP and your positive pregnancy test? no    Insurance bcbs o    
Not applicable

## 2023-11-13 NOTE — PROGRESS NOTES
Initial OB 9w0d    ALLISON by LMP (regular periods)     OBHx: ,  x4 - per pt no complications  -2nd pregnancy - genetic screening positive for [place] - had confirmation CVS - negative result  PMHx: denies hepatitis, blood transfusion, HSV, or VTE. Had LEEP   Last Pap 2023 - NILM   FMHx: Her maternal aunt born with \"hole in heart. \" -had it repaired  PSHx: denies       Genetic Screening discussed   -NIPT - does not want it, patient is undecided - ask at next visit.  -Carrier Screen - thinks she had it with her second pregnancy, unsure       1. AMA  -L2U, Growth US, NSTs  -consider ASA   -added CMP and uric acid to PNL     2. N&V   -Discussed bob tea, bob candy   -Rx Reglan sent to pharmacy. 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

## 2023-12-12 NOTE — TELEPHONE ENCOUNTER
[facility] order placed.  Specimen placed at  for pickup NEUROLOGY FOLLOW-UP NOTE    NAME:  MARTHA MARTINEZ      ASSESSMENT:  25 RHM with migraine with status migrainosus      RECOMMENDATIONS:    Continue Migraine management with Depacon protocol, which is starting to help:  - Valproate 500mg IV Q8H for up to 5 days (1 day complete)  - Diphenhydramine 25mg IV Q8H for same duration (premedication)  - Metoclopramide 10mg IV Q8H for same duration (premedication)  - Consider metoclopramide 10mg Q6H PRN breakthrough headache    - No additional neuroimaging indicated at this time        NOTE TO BE COMPLETED - PLEASE REFER TO ABOVE ONLY AND IGNORE INFORMATION BELOW    ******************************    HPI:  26 yo male with PMH of asthma presented to his PCP with complaints of headache, nausea and vomiting x 5 days. Pt was noted to have elevated sBP of >170 and was advised to come to emergency department. Patient states he started having headaches 5 days ago associated with nausea and 1 day hx of NBNB vomiting. Pt endorses episode of dysuria few days ago which has now resolved. Pt denies head trauma, any prior episodes of similar headaches. No family hx of Polycystic kidney disease, CVA, HTN. Denies diarrhea, abdominal pain, fever, chills, recent viral infection, no blood in urine, no flank pain, no urinary rentention (22 Feb 2021 20:11)      NEURO HPI:      INTERVAL HISTORY:      MEDICATIONS:  acetaminophen   Tablet .. 650 milliGRAM(s) Oral every 6 hours PRN  ALBUTerol    90 MICROgram(s) HFA Inhaler 2 Puff(s) Inhalation every 6 hours PRN  atorvastatin 40 milliGRAM(s) Oral at bedtime  budesonide  80 MICROgram(s)/formoterol 4.5 MICROgram(s) Inhaler 2 Puff(s) Inhalation two times a day  desmopressin IVPB 20 MICROGram(s) IV Intermittent once  diltiazem    milliGRAM(s) Oral daily  diphenhydrAMINE   Injectable 25 milliGRAM(s) IV Push every 8 hours PRN  hydrALAZINE 25 milliGRAM(s) Oral three times a day  lidocaine   Patch 1 Patch Transdermal daily  metoclopramide Injectable 5 milliGRAM(s) IV Push every 8 hours PRN  ondansetron Injectable 5 milliGRAM(s) IV Push every 6 hours PRN  sodium bicarbonate 650 milliGRAM(s) Oral daily  sodium chloride 0.9%. 1000 milliLiter(s) IV Continuous <Continuous>  valproate sodium IVPB 500 milliGRAM(s) IV Intermittent every 8 hours PRN      ALLERGIES:  Kiwi (Swelling)  No Known Drug Allergies      REVIEW OF SYSTEMS:  Fourteen systems reviewed and negative except as in HPI / Interval History.        OBJECTIVE:  Vital Signs Last 24 Hrs  T(C): 36.7 (03 Mar 2021 04:30), Max: 36.7 (02 Mar 2021 07:10)  T(F): 98.1 (03 Mar 2021 04:30), Max: 98.1 (02 Mar 2021 07:10)  HR: 94 (03 Mar 2021 04:30) (85 - 97)  BP: 116/69 (03 Mar 2021 04:30) (116/69 - 149/83)  BP(mean): --  RR: 18 (03 Mar 2021 04:30) (16 - 18)  SpO2: 97% (03 Mar 2021 04:30) (97% - 99%)    General Examination:  General: No acute distress  HEENT: Atraumatic, Normocephalic  Respiratory: CTA B/l.  No crackles, rhonchi, or wheezes.  Cardiovascular: RRR.  Normal S1 & S2.  Normal b/l radial and pedal pulses.    Neurological Examination:  General / Mental Status: AAO x 3.  No aphasia or dysarthria.  Naming and repetition intact.  Cranial Nerves: VFF x 4.  PERRL.  EOMI x 2, No nystagmus or diplopia.  B/l V1-V3 equal and intact to light touch and pinprick.  Symmetric facial movement and palate elevation.  B/l hearing equal to finger rub.  5/5 strength with b/l sternocleidomastoid & trapezius.  Midline tongue protrusion, with no atrophy or fasciculations.  Motor: Normal bulk & tone in all four extremities.  5/5 strength throughout all four extremities.  No downward drift, rigidity, spasticity, or tremors in any of the four extremities.  Sensory: Intact to light touch and pinprick in all four extremities.  Negative Romberg.  Reflex: 2+ and symmetric at b/l biceps, triceps, brachioradialis, patellae, and ankles.  Downgoing toes b/l.  Coordination: No dysmetria with b/l finger-to-nose and heel raise tests.  Symmetric rapid alternating movements b/l.  Gait: Normal, narrow-based gait.  No difficulty with tiptoe, heel, and tandem gaits.        LABORATORY VALUES:                          10.6   7.99  )-----------( 209      ( 02 Mar 2021 09:16 )             32.6       03-02    137  |  109<H>  |  52<H>  ----------------------------<  109<H>  5.4<H>   |  20<L>  |  6.54<H>    Ca    8.7      02 Mar 2021 09:16  Phos  3.8     03-02  Mg     1.8     03-02            02-23 Chol 320<H> LDL -- HDL 54 Trig 134    Glucose Trend  03-02-21 @ 09:16   -  -- 109<H> --  03-01-21 @ 09:31   -  -- 90 --  02-28-21 @ 07:48   -  -- 95 --                        NEUROIMAGING:          Please contact the Neurology consult service with any questions.      Shaun Richmond MD   of Neurology  [employer] [facility]         NEUROLOGY FOLLOW-UP NOTE    NAME:  MARTHA MARTINEZ      ASSESSMENT:  25 RHM with migraine with status migrainosus      RECOMMENDATIONS:    Continue Migraine management with Depacon protocol, which is starting to help:  - Valproate 500mg IV Q8H for up to 5 days (1 day complete)  - Diphenhydramine 25mg IV Q8H for same duration (premedication)  - Metoclopramide 10mg IV Q8H for same duration (premedication)  - Consider metoclopramide 10mg Q6H PRN breakthrough headache    - No additional neuroimaging indicated at this time        ******************************    HPI:  26 yo male with PMH of asthma presented to his PCP with complaints of headache, nausea and vomiting x 5 days. Pt was noted to have elevated SBP of >170 and was advised to come to emergency department. Patient states he started having headaches 5 days ago associated with nausea and 1 day hx of NB/NB vomiting. Pt endorses episode of dysuria few days ago which has now resolved. Pt denies head trauma, any prior episodes of similar headaches. No family hx of Polycystic kidney disease, CVA, HTN. Denies diarrhea, abdominal pain, fever, chills, recent viral infection, no blood in urine, no flank pain, no urinary rentention (22 Feb 2021 20:11)      NEURO HPI:  25 RHM presenting with 5-day history of headache, associated with nausea and vomiting, without previous history of headache    INTERVAL HISTORY:  The patient's headache intensity has decreased from 9/10 to 6/10 after receiving 1 day's worth of IV Depacon protocol. He is pending a renal biopsy for evaluation of poor renal function.    MEDICATIONS:  acetaminophen   Tablet .. 650 milliGRAM(s) Oral every 6 hours PRN  ALBUTerol    90 MICROgram(s) HFA Inhaler 2 Puff(s) Inhalation every 6 hours PRN  atorvastatin 40 milliGRAM(s) Oral at bedtime  budesonide  80 MICROgram(s)/formoterol 4.5 MICROgram(s) Inhaler 2 Puff(s) Inhalation two times a day  desmopressin IVPB 20 MICROGram(s) IV Intermittent once  diltiazem    milliGRAM(s) Oral daily  diphenhydrAMINE   Injectable 25 milliGRAM(s) IV Push every 8 hours PRN  hydrALAZINE 25 milliGRAM(s) Oral three times a day  lidocaine   Patch 1 Patch Transdermal daily  metoclopramide Injectable 5 milliGRAM(s) IV Push every 8 hours PRN  ondansetron Injectable 5 milliGRAM(s) IV Push every 6 hours PRN  sodium bicarbonate 650 milliGRAM(s) Oral daily  sodium chloride 0.9%. 1000 milliLiter(s) IV Continuous <Continuous>  valproate sodium IVPB 500 milliGRAM(s) IV Intermittent every 8 hours PRN      ALLERGIES:  Kiwi (Swelling)  No Known Drug Allergies      REVIEW OF SYSTEMS:  Fourteen systems reviewed and negative except as in HPI / Interval History.        OBJECTIVE:  Vital Signs Last 24 Hrs  T(C): 36.7 (03 Mar 2021 04:30), Max: 36.7 (02 Mar 2021 07:10)  T(F): 98.1 (03 Mar 2021 04:30), Max: 98.1 (02 Mar 2021 07:10)  HR: 94 (03 Mar 2021 04:30) (85 - 97)  BP: 116/69 (03 Mar 2021 04:30) (116/69 - 149/83)  RR: 18 (03 Mar 2021 04:30) (16 - 18)  SpO2: 97% (03 Mar 2021 04:30) (97% - 99%)    General Examination:  General: No acute distress  HEENT: Atraumatic, Normocephalic, No tenderness to palpation at scalp  Respiratory: CTA B/l.  No crackles, rhonchi, or wheezes.  Cardiovascular: RRR.  Normal S1 & S2.  Normal b/l radial and pedal pulses.    Neurological Examination:  General / Mental Status: AAO x 3.  No aphasia or dysarthria.  Naming and repetition intact.  Cranial Nerves: VFF x 4.  PERRL.  EOMI x 2, No nystagmus or diplopia.  B/l V1-V3 equal and intact to light touch and pinprick.  Symmetric facial movement and palate elevation.  B/l hearing equal to finger rub.  5/5 strength with b/l sternocleidomastoid & trapezius.  Midline tongue protrusion, with no atrophy or fasciculations.  Motor: Normal bulk & tone in all four extremities.  5/5 strength throughout all four extremities.  No downward drift, rigidity, spasticity, or tremors in any of the four extremities.  Sensory: Intact to light touch and pinprick in all four extremities.  Negative Romberg.  Reflex: 2+ and symmetric at b/l biceps, triceps, brachioradialis, patellae, and ankles.  Downgoing toes b/l.  Coordination: No dysmetria with b/l finger-to-nose and heel raise tests.  Symmetric rapid alternating movements b/l.  Gait: Normal, narrow-based gait.  No difficulty with tiptoe, heel, and tandem gaits.        LABORATORY VALUES:                          10.6   7.99  )-----------( 209      ( 02 Mar 2021 09:16 )             32.6       03-02    137  |  109<H>  |  52<H>  ----------------------------<  109<H>  5.4<H>   |  20<L>  |  6.54<H>    Ca    8.7      02 Mar 2021 09:16  Phos  3.8     03-02  Mg     1.8     03-0    02-23 Chol 320<H> LDL -- HDL 54 Trig 134    Glucose Trend  03-02-21 @ 09:16   -  -- 109<H> --  03-01-21 @ 09:31   -  -- 90 --  02-28-21 @ 07:48   -  -- 95 --          NEUROIMAGING:    CT Head x 2 (2/22/21 & 2/28/21):  - No acute intracranial abnormality or interval change between scans    TTE (2/24/21):  - LVEF > 55%  - Mild concentric left ventricular hypertrophy  - Mild mitral and tricuspid regurgitation  - Trace tricuspid regurgitation  - No cardiac embolus or intracardiac shunt          Please contact the Neurology consult service with any questions.      Shaun Richmond MD   of Neurology  [facility] [employer] [facility]

## 2023-12-12 NOTE — PROGRESS NOTES
THERESA - 13w1d     Nausea is improving, still on/off, reglan has helped    ALLISON by LMP (regular periods) c/w 8wk US  -NIPT - wants, drawn  -Carrier Screen - may have had with prior pregnancy, thinks probably not, declines    1. AMA  -L2U - ordered  -Growth US, NSTs  -consider ASA - recommended, pt's sister also had preeclampsia so pt has 2 moderate risk factors  -added CMP and uric acid to PNL - OK    2. N&V   -Discussed bob tea, bob candy   -Rx Reglan sent to pharmacy, helping    3.  Hx LEEP in   -4x term

## 2023-12-12 NOTE — TELEPHONE ENCOUNTER
Pt  12  bueno pregnancy request NIPS per Dr. Thea Stern     [facility]    Patients name &  verified on lab tubes with patient. NIPS screen lab drawn, patient tolerated well. Specimen placed at . IN[facility]AE access, call in 2 weeks for result, Cautioned patient may receive results via email from [facility] that includes gender results discussed. Patient verbalized understanding.

## 2023-12-12 NOTE — PATIENT INSTRUCTIONS
Low dose aspirin  -recommended if 1 or more risk factors for preeclampsia  -seems to help reduce risk of preeclampsia  -recommended time to start is 11-12 weeks  -current MFM recommendation is 1.5 tablets of the aspirin 81 mg tab. If you cannot split the tablet you can take 2.

## 2023-12-26 NOTE — TELEPHONE ENCOUNTER
Pt calling to check on the status of her invitae genetic testing. Pt states she received an email on the 17th that we have the results. Please advise.

## 2024-01-08 NOTE — PROGRESS NOTES
THERESA 17w0d    She is doing fine, no complaints  -unable to doppler fht's- STAT US ordered    ALLISON by LMP (regular periods) c/w 8wk US  -NIPT - negative   -Carrier Screen - may have had with prior pregnancy, thinks probably not, declines     1.AMA  -L2U - ordered  -Growth US, NSTs  -consider ASA - recommended, pt's sister also had preeclampsia so pt has 2 moderate risk factors  -added CMP and uric acid to PNL - OK     2. N&V   -Discussed bob tea, bob candy   -Rx Reglan sent to pharmacy, helping     3. Hx LEEP in 2009  -4x term

## 2024-01-29 NOTE — PROGRESS NOTES
Reason for Consult:   Dear Dr. Parkinson,    Thank you for requesting ultrasound evaluation and maternal fetal medicine consultation on Gay Monae.  As you are aware she is a 38 year old female with a Batres pregnancy at 20w0d.  A maternal-fetal medicine consultation was requested secondary to  AMA.  Her prenatal records and labs were reviewed.    Review of History:     OB History:    OB History    Para Term  AB Living   5 4 4 0 0 4   SAB IAB Ectopic Multiple Live Births   0 0 0 0 4      # Outcome Date GA Lbr Devyn/2nd Weight Sex Delivery Anes PTL Lv   5 Current            4 Term 19 38w1d 05:13 / 00:21 6 lb 12.6 oz (3.08 kg) F Vag-Spont EPI N LEDA      Name: CELSABABY GIRL  GAY      Apgar1: 9  Apgar5: 9   3 Term 11/15/17    F Vag-Spont  N LEDA   2 Term 16    F Vag-Spont   LEDA   1 Term 07    F Vag-Spont   LEDA             Allergies:  Allergies   Allergen Reactions    Latex RASH and SWELLING    Peanuts HIVES    Tree Nuts HIVES      Current Meds:  Current Outpatient Medications   Medication Sig Dispense Refill    Prenatal MV-Min-Fe Fum-FA-DHA (PRENATAL 1 OR) Take by mouth.      metoclopramide (REGLAN) 10 MG Oral Tab Take 1 tablet (10 mg total) by mouth every 6 (six) hours as needed. 20 tablet 0        HISTORY:  Past Medical History:   Diagnosis Date    Allergic rhinitis     Human papilloma virus infection     Migraine       Past Surgical History:   Procedure Laterality Date    COLONOSCOPY  2021    indic: polyps    IMPLANTABLE BREAST PROSTHESIS      LEEP        Family History   Problem Relation Age of Onset    Hypertension Mother     Lipids Mother     Diabetes Maternal Grandmother     Cancer Other         kidney ca      Social History     Socioeconomic History    Marital status:    Tobacco Use    Smoking status: Never    Smokeless tobacco: Never   Substance and Sexual Activity    Alcohol use: Yes    Drug use: Never    Sexual activity: Yes     Partners: Male      Birth control/protection: I.U.D., Mirena   Other Topics Concern    Caffeine Concern Yes     Comment: 1x cup daily    Exercise No    Seat Belt Yes        NARRATIVE:   /69 (BP Location: Right arm, Patient Position: Sitting, Cuff Size: adult)   Pulse 74   Ht 5' 5\" (1.651 m)   Wt 164 lb (74.4 kg)   LMP 2023 (Exact Date)   BMI 27.29 kg/m²            Alert and Oriented.  No acute distress          Abdomen:  soft, nontender, no contractions noted.           extremities:  nontender, no edema     DISCUSSION  During her visit we discussed and reviewed the following issues:  ADVANCED MATERNAL AGE    Background  I reviewed with the patient that pregnancies in women of advanced maternal age (35 or older at delivery) are associated with elevated risks. Specifically, there is a higher rate of:  Fetal malformations  Preeclampsia  Gestational diabetes  Intrauterine fetal death    As a result, enhanced pregnancy surveillance is advised for these patients including a comprehensive ultrasound to assess for fetal malformations (at 20 weeks) and a third trimester ultrasound assessment for fetal growth (at 32 weeks). In addition, weekly NST's (initiating at 36 weeks gestation for women 35-39 years and at 32 weeks gestation for women 40 years and older) are also advised. Routine obstetric care is more than adequate to assess for gestational diabetes and preeclampsia; hence, no further significant alterations in obstetric care are advised.    Medical Complications    Women 35 years of age or older can expect to experience two to three fold higher rates of hospitalization,  delivery, and pregnancy-related complications when compared to their younger counterparts.  The two most common medical problems complicating these  pregnanccies are hypertension and diabetes.   The incidence of preeclampsia in the general obstetric population is 3 to 4 percent; this increases to 5 to 10 percent in women over age 40 and is as high  as 35 percent in women over age 50.   The incidence of gestational diabetes in the general obstetric population is 3 percent, rising to 7 to 12 percent in women over age 40 and 20 percent in women over age 50.  Women 35 years of age or older are more likely to be delivered by . The  delivery rate in the general obstetric population of the [place] is almost 30 percent, compared to almost 50 percent in women over age 40 to 45 and almost 80 percent in women age 50 to 63.          Fetal Death        A decision analysis tool using data from the [place] [facility]  Database predicted a strategy of weekly antepartum testing and labor induction would lower the risk of unexplained fetal death in women 35 years of age or older. In this model, weekly testing starting at 36 weeks of gestation would drop the risk of fetal death from 5.2 to 1.3 per 1000 pregnancies. While a policy of antepartum testing in older women does increase the chance that a women will be induced (71 inductions per fetal death averted) and thereby increases her risk of having a  delivery, only 14 additional cesareans would need to be performed to avert one unexplained fetal death.  Hence, weekly NST's are advised for women of advanced maternal age; testing should be initiated at 36 weeks for women 35-39 years and at 32 weeks for women 40 years and older.    Fetal Malformations    Cardiac malformations, clubfoot, and diaphragmatic hernia appear to occur with increased frequency in offspring of older women. These abnormalities are structural and unrelated to aneuploidy, thus they would not be detected by karyotype analysis.  For these reasons a complete, detailed ultrasound (level II) is advised even if the fetus has a normal karyotype.      Fetal Aneuploidy      Invasive Testing  I offered invasive genetic testing (amniocentesis, chorionic villus sampling) after reviewing the diagnostic accuracy of these tests as well  as the procedure associated loss rate (1:500 for genetic amniocentesis).        We discussed  the increased risk of chromosomal abnormalities associated with advanced maternal age at age 38 year old. She understands that ultrasound exam cannot exclude potential genetic abnormalities.  Her estimated risk based on maternal age at amniocentesis with any chromosome abnormality is about 1:50  and with Down Syndrome is about 1: 90.   We also discussed the risks and benefits of having  genetic testing ([facility] and amniocentesis) performed.      Non-invasive Pregnancy Testing (NIPT)  I reviewed current non-invasive screening options. Currently non-invasive pregnancy testing (NIPT) offers the highest detection rate (with the lowest false positive rate) for the detection of fetal aneuploidy amongst high-risk patients. The limitations of detailed mid-trimester sonography was reviewed with the patient. First trimester screening and second trimester multiple-marker serum serum screening as alternative aneuploidy screening options were also reviewed. However, both of these tests are associated with lower detection and higher false positive rates.        OB ULTRASOUND REPORT   See imaging tab for complete ultrasound report or in PACS    Single IUP in breech presentation.    Placenta is posterior.   A 3 vessel cord is noted.  Cardiac activity is present at 144 bpm   g ( 0 lb 11 oz);    MVP is 4.5 cm .       Fetal Anatomy:  Visualized with normal appearance: head, face, spine, neck, skin, chest, abdominal wall, gastrointestinal tract, kidneys, bladder, extremities.   Brain: Visualized and normal appearances: brain parenchyma, cerebral ventricles, choroid plexus, Cisterna Magna, midline falx, cerebellum, cerebellar lobes, posterior fossa, vermis, cavum septi pellucidi.  Face: eyes normal, profile normal, nose normal, lip normal, palate normal.  Heart: visualized and normal appearance: 3 vessel view, four-chamber, left outflow  tract, right outflow tract, arches.      Genetic Sonogram:  Nuchal fold normal.  Pyelectasis absent.  No hyperechogenic bowel.  Echogenic intracardiac foci absent. Nasal bone present. Choroid plexus cyst absent.      Summary of Ultrasound findings:  This is a Batres pregnancy    The fetal measurements are consistent with established EDC. No gross ultrasound evidence of structural abnormalities are seen today. No minor markers for aneuploidy are seen. The patient understands that ultrasound cannot rule out all structural and chromosomal abnormalities.       IMPRESSION:   1. IUP @  20w0d  2. Scan consistent with dates  3. No fetal structural abnormalities seen  4. AMA-- Counsyl test low risk    RECOMMENDATIONS:   1.  Weekly NST at 36wks  2.  Growth US at 32wks    Thank you for allowing me to participate in the care of your patient.  Please do not hesitate to call with any questions or concerns.     Total time spent   45  minutes this calendar day which includes preparing to see the patient including chart review, obtaining and/or reviewing additional medical history, performing a physical exam and evaluation, documenting clinical information in the electronic medical record, independently interpreting results, counseling the patient, communicating results to the patient/family/caregiver and coordinating care.    Fausto Abbott D.O.  Maternal Fetal Medicine     Note to patient and family:  The 21st Century Cures Act makes medical notes available to patients in the interest of transparency.  However, please be advised that this is a medical document.  It is intended as a peer to peer communication.  It is written in medical language and may contain abbreviations or verbiage that are technical and unfamiliar.  It may appear blunt or direct.  Medical documents are intended to carry relevant information, facts as evident, and the clinical opinion of the practitioner.

## 2024-02-07 NOTE — PROGRESS NOTES
THERESA - 21w2d     Noticing more headaches, generally mild, but more frequent since pregnancy  Also having palpitations, feels several skipped beats in a row, happens relatively frequently, never happened before, just with start of pregnancy  -will order holter monitoring  -recommended good PO hydration for HA, can try magnesium supplement    ALLISON by LMP (regular periods) c/w 8wk US  -NIPT - negative   -Carrier Screen - may have had with prior pregnancy, thinks probably not, declines     1.AMA  -L2U normal  -Growth US 32wk   -NSTs 36wk  -consider ASA - recommended, pt's sister also had preeclampsia so pt has 2 moderate risk factors  -added CMP and uric acid to PNL - OK     2. Hx LEEP in   -4x term

## 2024-03-04 NOTE — PROGRESS NOTES
THERESA 25w0d    She is doing well, no complaints  -Holter monitor completed - results pending.   -1hr GTT, CBC, 3rd tri HIV discussed and ordered - timed for it to be done at 27w1d  -TDAP discussed - ask at next visit.     ALLISON by LMP (regular periods) c/w 8wk US  -NIPT - negative   -Carrier Screen - may have had with prior pregnancy, thinks probably not, declines     1.AMA  -L2U normal  -Growth US 32wk   -NSTs 36wk  -consider ASA - recommended, pt's sister also had preeclampsia so pt has 2 moderate risk factors, she is not taking, encouraged   -added CMP and uric acid to PNL - OK     2. Hx LEEP in 2009  -4 term

## 2024-03-22 NOTE — PROGRESS NOTES
OB problem visit 27w4d    She c/o thick yellowish vaginal discharge for 2 weeks - Vaginitis PCR done.       ALLISON by LMP (regular periods) c/w 8wk US  -NIPT - negative   -Carrier Screen - may have had with prior pregnancy, thinks probably not, declines  -1 hr GTT, CBC, and 3rd tri HIV done   -TDAP discussed - ask at next visit      1.AMA  -L2U normal  -Growth US 32wk - has appointment   -NSTs 36wk  -consider ASA - recommended, pt's sister also had preeclampsia so pt has 2 moderate risk factors, she is not taking, encouraged   -added CMP and uric acid to PNL - OK     2. Hx LEEP in   -4 term

## 2024-03-25 NOTE — PROGRESS NOTES
THERESA    Chief Complaint   Patient presents with    Prenatal Care     28 weeks   Pt saw TK in [place] on Friday regarding some discharge that started two weeks ago, pt states she's still having the yellow-shayy discharge    Other     Pt declines TDAP at this time      3/22/24 Vaginal ID swab NEGATIVE    +FM. No itching. No malodor. Thick creamy consistency. Staining underwear.     Physical Exam  Vitals and nursing note reviewed.   Constitutional:       Appearance: Normal appearance.   Genitourinary:     Comments: VULVA: small white opaque discharge at introitis   URETHRA: unremarkable.    PERINEUM: intact   VAGINA: thicker yellow white opaque discharge. No malodor. Swabbing cervix caused tiny amount of bleeding from ectropion.   CERVIX: parous, closed   UTERUS: deferred  ADNEXA: deferred  PELVIC FLOOR: no hypertonicity, mild tenderness     Musculoskeletal:      Right lower leg: No edema.      Left lower leg: No edema.   Neurological:      Mental Status: She is alert.   Psychiatric:         Mood and Affect: Mood normal.         Behavior: Behavior normal.         Thought Content: Thought content normal.         Judgment: Judgment normal.           38 year old  at 28w0d   ALLISON 24 by LMP (regular periods) c/w 8wk US  O+    -NIPS - negative   -Carrier Screen - may have had with prior pregnancy, thinks probably not, declines  -1 hr GTT, CBC, and 3rd tri HIV done   -TDAP declines    -classes, pre-registration, pediatrician, breast pump, car seat discussed     Asking about IOL to try to plan. Trying to avoid father's day, etc. Will readdress at future.      1.AMA  -L2 US normal  -Growth US 32wk - has appointment . Order placed.   -pt's sister also had preeclampsia in a twin pregnancy (got to 37 wk), so pt has 2 moderate risk factors, she is not taking, encouraged - declines   -added CMP and uric acid to PNL - OK  -weekly NST at 36 wk      2. Hx LEEP in   -4 term  after LEEP    3. JAIME   -meds -  N  -therapist -N  - referral -N    4. Overweight (pre preg BMI 25.8)  -wt gain goal 15-25 lb discussed     Palpitations  -thinks maybe anxiety related  -did Holter monitor 3/1/24   -magnesium recommended     Vaginal discharge, 3/25/2024   -Vaginal ID swab cervical source collected 3/25/2024     RTC 2 wk

## 2024-03-25 NOTE — PATIENT INSTRUCTIONS
Weight gain goal 15-25 lb for the entire pregnancy     Magnesium glycinate 500 mg nightly     Tdap (Tetanus, Diptheria, Pertussis)   -booster shot for pertussis (whooping cough)  -recommended by the CDC (Centers for Disease Control) for every pregnant woman in the third trimester (28 weeks and beyond)  -the purpose of giving the vaccine during pregnancy is so that the mother can share her antibodies with the fetus across the placenta  -it is recommended to take this vaccine early in the third trimester so that your body has enough time to produce the antibodies that can pass across the placenta to the fetus  -the infant cannot be vaccinated for pertussis until 2 months of age due to an immature immune system and lack of response to the vaccine prior to that time.   -the father of the baby as well as grandparents, other caregivers, etc should receive a booster shot for whooping cough as well (vaccination at least 1 time after the age of 18 is sufficient)     [facility] Prenatal Classes (and virtual tour of Labor & Delivery unit)  [email or web address]  [phone]    Pre-registration for the hospital  [email or web address]    Breast pump  -contact your insurance to request them to send an order to us for their preferred medical supply company  Or  -contact [facility] (flyer available on the lobby wall across from reception desk)   [email or web address]    Car seat *MUST* be installed before infant(s) can be leave the hospital    Doctor for baby   *Please select a pediatrician or family medicine provider BEFORE you are admitted to the hospital to give birth.   Pediatrics (birth-18 years of age) or Family Medicine (birth through adulthood)  Make sure that provider accepts your insurance.   Pick a provider that is close to where you live.  If the provider is on staff at [facility], the nursing staff will notify them when your infant is born so they are aware  to come to the hospital to examine the infant.   If the provider you have chosen is not on staff at [facility], a pediatric hospitalist will care for your infant during the hospitalization only. You must arrange the follow up visit with your provider.   After delivery at the hospital follow up visit instructions for baby will be provided prior to discharge.     The baby will not be able to leave the hospital without a follow up visit scheduled.     To establish care:  [email or web address]  Or   [facility] Physician Referral line at [phone]    There are MANY providers available. It would be impossible to list them all, but here are a few popular pediatrics groups in [place]:    [facility] [place] [phone]  [facility] [place] [phone]  [facility] [place] [phone]  [facility] [place] [phone]  [facility] [place] [phone]  [facility] [phone]    There are also many wonderful independent practitioners as well. We recommend you speak with family, friends, colleagues as personal recommendations can be very helpful.

## 2024-03-25 NOTE — TELEPHONE ENCOUNTER
Pt is asking if the Dr would sign her applications for the handicapped sticker. She is 28 weeks pregnant. Thank you

## 2024-04-01 NOTE — TELEPHONE ENCOUNTER
Gardnerella vaginalis 1 x 10^5 copies/uL 49.505%  Lactobacillus gasseri 1 x 10^5 copies/uL 49.505%  Atopobium vaginae 1 x 10^3 copies/uL 0.495%  Ureaplasma urealyticum 1 x 10^3 copies/uL 0.495%    Abstracted, placed in provider bin for review.

## 2024-04-02 NOTE — TELEPHONE ENCOUNTER
Pt aware of need for partner testing. He will call his PCP for their recommendations for treatment. Verbalized understanding.

## 2024-04-10 NOTE — PROGRESS NOTES
Patient had positive vaginal culture for BV and ureaplasma, she already took Flagyl , her  saw PCP and tested negative, declined treatment for ureaplasma. Reviewed results and risk for PTL, questions answered     ALLISON 24 by LMP (regular periods) c/w 8wk US      -NIPS - negative   -Carrier Screen - may have had with prior pregnancy, thinks probably not, declines  -1 hr GTT, CBC, and 3rd tri HIV done   -TDAP declines    -classes, pre-registration, pediatrician, breast pump, car seat discussed        1.AMA  -L2 US normal  -Growth US 32wk - has appointment . Order placed.   -pt's sister also had preeclampsia in a twin pregnancy (got to 37 wk), so pt has 2 moderate risk factors, she is not taking, encouraged - declines   -added CMP and uric acid to PNL - OK  -weekly NST at 36 wk      2. Hx LEEP in   -4 term  after LEEP    3. JAIME   -meds - N  -therapist -N  - referral -N    4. Overweight (pre preg BMI 25.8)  -wt gain goal 15-25 lb discussed     Palpitations  -thinks maybe anxiety related  -did Holter monitor 3/1/24   -magnesium recommended

## 2024-04-22 NOTE — PROGRESS NOTES
Reason for Consult:   Dear Dr. Parkinson,    Thank you for requesting ultrasound evaluation and maternal fetal medicine consultation on Gay Monae.  As you are aware she is a 38 year old female with a Batres pregnancy.  A maternal-fetal medicine consultation was requested secondary to  AMA.  Her prenatal records and labs were reviewed.    Review of History:     OB History:    OB History    Para Term  AB Living   5 4 4 0 0 4   SAB IAB Ectopic Multiple Live Births   0 0 0 0 4      # Outcome Date GA Lbr Devyn/2nd Weight Sex Type Anes PTL Lv   5 Current            4 Term 19 38w1d 05:13 / 00:21 6 lb 12.6 oz (3.08 kg) F Vag-Spont EPI N LEDA      Name: CELSA,BABY GIRL  GAY      Apgar1: 9  Apgar5: 9   3 Term 11/15/17    F Vag-Spont  N LEDA   2 Term 16    F Vag-Spont   LEDA   1 Term 07    F Vag-Spont   LEDA      Obstetric Comments   2016 -  pregnancy - genetic screening positive for [place] - had confirmation [facility] - negative result             Allergies:  Allergies   Allergen Reactions    Latex RASH and SWELLING    Peanuts HIVES    Tree Nuts HIVES      Current Meds:  Current Outpatient Medications   Medication Sig Dispense Refill    Magnesium 500 MG Oral Tab Take by mouth.      Prenatal MV-Min-Fe Fum-FA-DHA (PRENATAL 1 OR) Take by mouth.      metoclopramide (REGLAN) 10 MG Oral Tab Take 1 tablet (10 mg total) by mouth every 6 (six) hours as needed. (Patient not taking: Reported on 4/10/2024) 20 tablet 0        HISTORY:  Past Medical History:    Allergic rhinitis    Carrier of ureaplasma urealyticum    tiny amount on Vaginal ID swab done for abnormal vaginal discharge during pregnancy.    Elevated LFTs    resolved    JAIME (generalized anxiety disorder)    History of loop electrical excision procedure (LEEP)    Human papilloma virus infection    Migraine    Overweight (BMI 25.0-29.9)    Wears glasses      Past Surgical History:   Procedure Laterality Date    Colonoscopy  2021     indic: polyps    Implantable breast prosthesis      Insert intrauterine device  2019    Leep  2009    Remove intrauterine device  2/2023      Family History   Problem Relation Age of Onset    Hypertension Mother     Lipids Mother     Other (Pre-eclampsia) Sister     Diabetes Maternal Grandmother     Birth Defects Maternal Aunt         born with hole in heart & had it repaired    Cancer Other         kidney ca      Social History     Socioeconomic History    Marital status:    Tobacco Use    Smoking status: Never    Smokeless tobacco: Never   Substance and Sexual Activity    Alcohol use: Yes    Drug use: Not Currently     Types: Cannabis    Sexual activity: Yes     Partners: Male     Birth control/protection: I.U.D., Mirena   Other Topics Concern    Caffeine Concern Yes     Comment: 1x cup daily    Exercise No    Seat Belt Yes        NARRATIVE:   /65 (BP Location: Right arm, Patient Position: Sitting, Cuff Size: adult)   Pulse 73   Ht 5' 5\" (1.651 m)   Wt 175 lb (79.4 kg)   LMP 09/11/2023 (Exact Date)   BMI 29.12 kg/m²            Alert and Oriented.  No acute distress          Abdomen:  soft, nontender, no contractions noted.           extremities:  nontender, no edema     DISCUSSION  During her visit we discussed and reviewed the following issues:  ADVANCED MATERNAL AGE    Background  I reviewed with the patient that pregnancies in women of advanced maternal age (35 or older at delivery) are associated with elevated risks. Specifically, there is a higher rate of:  Fetal malformations  Preeclampsia  Gestational diabetes  Intrauterine fetal death        OB ULTRASOUND REPORT   See imaging tab for complete ultrasound report or in PACS    Single IUP in cephalic presentation.    Placenta is posterior.   A 3 vessel cord is noted.  Cardiac activity is present at 132 bpm  EFW 1971 g ( 4 lb 6 oz); 53%.    RACHEAL is  12.5 cm.  MVP is 6.3 cm      BIOPHYSICAL PROFILE:  Movement:    2/2  Tone:             2/  Breathin2  Fluid:               TOTAL:                 IMPRESSION:   1. IUP @ 32w0d   2. Scan consistent with dates  3. No fetal structural abnormalities seen  4. AMA-- Counsyl test low risk    RECOMMENDATIONS:   1.  Weekly NST at 36wks      Thank you for allowing me to participate in the care of your patient.  Please do not hesitate to call with any questions or concerns.     Total time spent  20  minutes this calendar day which includes preparing to see the patient including chart review, obtaining and/or reviewing additional medical history, performing a physical exam and evaluation, documenting clinical information in the electronic medical record, independently interpreting results, counseling the patient, communicating results to the patient/family/caregiver and coordinating care.    Fausto Abbott D.O.  Maternal Fetal Medicine     Note to patient and family:  The 21st Century Cures Act makes medical notes available to patients in the interest of transparency.  However, please be advised that this is a medical document.  It is intended as a peer to peer communication.  It is written in medical language and may contain abbreviations or verbiage that are technical and unfamiliar.  It may appear blunt or direct.  Medical documents are intended to carry relevant information, facts as evident, and the clinical opinion of the practitioner.

## 2024-04-22 NOTE — PROGRESS NOTES
Pt here for growth ultrasound  + FM  Pt states feeling daily uterine cramping, denies vag bleeding and leaking fluid.

## 2024-04-24 NOTE — PATIENT INSTRUCTIONS
[facility]    [address], [address], [place] [address]. Ph: [phone]  [address], [address], [address]nd [place] [address]. Ph: [phone] & [phone]  [address], [place] [address]. Ph [phone]  [address], [place] [address]. Ph [phone]  [address] [place] [address]. Ph [phone]  [address] [facility], [place] [address]. Ph [phone]      The Role of Physical Therapy in the Treatment of Pelvic Floor Dysfunction:    Physical therapists are trained to evaluate and treat dysfunctions in the joints, muscles, nerves and scar. Physical therapists specifically trained in the area of pelvic health can identify the possible musculoskeletal causes of pelvic pain, bladder and bowel difficulties and develop a treatment plan specific to the individual suffering from this difficulties.     What to expect at your first physical therapy appointment:   Your first visit will include an initial evaluation in a comfortable, private room by a therapist who has undergone advanced education and training in the evaluation and treatment of pelvic muscle dysfunction. The therapist will obtain a detailed history of your health, pain and activity limitations. She will also ask you about any bowel, bladder and sexual difficulties as these are in part controlled by the pelvic muscles. The therapist will then take a look at your posture, mobility of your spine and hips and strength and flexibility of pelvic girdle muscles. She will examine any scar tissue and trigger points in the muscles of your pelvic region.     The therapist will also specifically examine the pelvic floor muscles. Your pelvic floor consists of a group of muscles that attach behind the pubic bone in the front to the tail bone in the back. They are responsible for providing support to the pelvic joints and organs, relaxing to allow the passage of urine, stool and gas and parth to prevent  the loss of urine, stool and gas as appropriate. In order to best examine these muscles you will be asked to undress from the waist down and be covered with a sheet. The therapist will use a lubricated, gloved finger to identify painful muscles around and in your vagina or rectum then instruct you to contract and relax these muscles in order to determine how the muscles are functioning. Care is taken to make you as comfortable as possible with the exam.     Your therapist will discuss the evaluation results with you and provide you with education regarding your specific condition and the expectation of therapy. She will answer all of your questions and will work with you to establish a treatment plan based on the results of the evaluation and your goals for therapy.

## 2024-04-24 NOTE — PROGRESS NOTES
THERESA    3/22/24 Vaginal ID swab NEGATIVE  3/25/24 Vaginal ID swab +Gardnerella, Lactobacillus, Atopbium, Ureaplasma.    Rx azithromycin & metronidazole & partner treatment advised for ureaplasma     Took metronidazole - no change in discharge  Just today took the azithromycin. Awaiting to see if any change   No malodor.    checked out. He was negative for ureaplasma so was not treated.     Palpitations still there about a few times per day.   Yesterday had about 5 beats in a row. Notes more if lying on left side.  Also tends to feel the palpitations if she gets a stronger uterine contraction that takes her breath away.     Pubic bone pain & sharp shooting pains   -occurs mainly when she is getting up out of bed after lying there for a few hours (I.e. getting up to void after falling asleep)     +FM. Yazoo Bergeron daily a few times per day. Sometimes up 1 minute at a time & can take her breath away. May not be drinking enough water. No VB or LOF.     38 year old  at 32w2d   ALLISON 24 by LMP (regular periods) c/w 8wk US  O+    -NIPS - negative, GIRL   -Carrier Screen - may have had with prior pregnancy, thinks probably not, declines  -1 hr GTT, CBC, and 3rd tri HIV done   -TDAP declines    -3rd trim syphilis screen discussed & ordered.   -classes, pre-registration, pediatrician, breast pump, car seat discussed     Asking about IOL to try to plan. Trying to avoid father's day, etc. Patient hoping for . Message sent to  for cytotec (can adjust as needed based on cervical exam)       1.AMA  -L2 US normal  -Growth US 32wk - EFW 53%, MVP 6.3 cm, BPP 8/8  -pt's sister also had preeclampsia in a twin pregnancy (got to 37 wk), so pt has 2 moderate risk factors, she is not taking, encouraged - declines   -added CMP and uric acid to PNL - OK  -weekly NST at 36 wk      2. Hx LEEP in   -4 term  after LEEP    3. JAIME   -meds - N  -therapist -N  -BH referral -N    4. Overweight (pre preg BMI  25.8)  -wt gain goal 15-25 lb discussed     Palpitations  -has had a few episodes throughout her life. Never saw cardiologist  -thinks maybe anxiety related  -did Holter monitor 3/1/24   -magnesium - taking   -4/24/24 - 32w2d - will check CBC, CMP, TSH, Mg    Vaginal discharge, 3/25/2024   -Vaginal ID swab 3/25/2024 - Gardnerella, Ureaplasma. Rx Flagyl & Azithromycin. Partner tested negative.     Pubic bone pain, pelvic instability, intermittent urinary frequency & sensation of incomplete void  -PPFT ordered     RTC 2 wk. Weekly NST at 36 wk

## 2024-04-25 NOTE — TELEPHONE ENCOUNTER
----- Message from Ebony Gómez MD sent at 4/24/2024  5:46 PM CDT -----  Regarding: Please schedule IOL  Please schedule IOL for 39 wk  Will be for AMA    She is trying to avoid father's day, etc. Patient hoping for 6/11    I'm thinking maybe PM cytotec on 6/10 so that hopefully she'll deliver on 6/11.     Thanks!

## 2024-04-25 NOTE — TELEPHONE ENCOUNTER
Spoke with patient. Explained to her about a cytotec induction. She verbalized understanding and agrees with plan of care.

## 2024-05-03 NOTE — TELEPHONE ENCOUNTER
Will call pt back once lab resulted. Patient verbalized understanding, agreed to and intend to comply with plan of care.

## 2024-05-06 NOTE — PROGRESS NOTES
THERESA 34.0    Denies LOF, VB,  +FM  Having period cramps where they are low.    Last a couple seconds.    Has pain in her pubic pain.  Was recommended to do pelvic PT but doesn't have an appt scheduled until  (in 8 days).  Declines cervical check.   Denies HA, VC, CP, SOB, RUQ pain    Bilateral arm numbness/parastesias  - happening daily  - hurts the most in her hands but whole arms  - likely carpal tunnel syndrome    38 year old  at 32w2d   ALLISON 24 by LMP (regular periods) c/w 8wk US  O+    -NIPS - negative, GIRL   -Carrier Screen - may have had with prior pregnancy, thinks probably not, declines  -1 hr GTT, CBC, and 3rd tri HIV done   -TDAP declines    -3rd trim syphilis screen discussed & ordered.   -classes, pre-registration, pediatrician, breast pump, car seat discussed   -Cytotec IOL scheduled for 6/10     1.AMA  -L2 US normal  -Growth US 32wk - EFW 53%, MVP 6.3 cm, BPP   -pt's sister also had preeclampsia in a twin pregnancy (got to 37 wk), so pt has 2 moderate risk factors, she is not taking, encouraged - declines   -added CMP and uric acid to PNL - OK  -weekly NST at 36 wk      2. Hx LEEP in   -4 term  after LEEP    3. JAIME   -meds - N  -therapist -N  - referral -N  - feeling some anxiety due to low iron    4. Overweight (pre preg BMI 25.8)  -wt gain goal 15-25 lb discussed     Palpitations  -has had a few episodes throughout her life. Never saw cardiologist  -thinks maybe anxiety related  -did Holter monitor 3/1/24   -magnesium - taking   - electrolytes/mg/TSH WNL    Vaginal discharge, 3/25/2024   -Vaginal ID swab 3/25/2024 - Gardnerella, Ureaplasma. Rx Flagyl & Azithromycin. Partner tested negative.     Pubic bone pain, pelvic instability, intermittent urinary frequency & sensation of incomplete void  -PPFT starting 5/15    Iron deficiency anemia  HgB borderline low 11.5  Considering IV iron, but will start taking PO iron every other day    Carpal tunnel  Declines  ortho  Discussed hand splints.  Considering    RTC 2 wk. Weekly NST at 36 wk

## 2024-05-06 NOTE — PROGRESS NOTES
Pt aware of results & recommendations for IV iron. Will place [facility] referral and call pt once approved. Verbalized understanding.

## 2024-05-14 NOTE — PROGRESS NOTES
MUSCULOSKELETAL AND PELVIC FLOOR EVALUATION:     Diagnosis:   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 Pain is worse with turning, dressing, transferring positions, rolling in bed. Waking to void 4-5 times a night. Intermittent back soreness. Have to assist L leg to get into car. Urinary frequency greater with this pregnancy both day and night. Dealing with CTS in pregnancy and feeling numbness in hands. Having neck pain.     Induction scheduled for 6/11.      Pt describes pain level: current 7/10, best 3/10, worst 8/10.  Pulsating pain. Deny numbness and tingling.   Worse  rolling, walking ( back pain), car transfers, lifting L leg for don/doff clothing and bathing,transition from rest  Better  keeping busy to no think about pain, sitting, lying down      Pregnant Now: Yes, induction scheduled 6/11  Obstetrical/Gynecological history:  G 5, P 4, T 4 , L 4  - All vaginals deliveries with episiotomy.  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 Did not have same pain with pregnancy. Voiding frequency and constipation present before . Pt goals include less pain with transferring positions.  Past medical history was reviewed with Olesya. 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    Patient  has a past surgical history that includes colonoscopy (2018, 2021); leep (2009); implantable breast prosthesis; insert intrauterine device (2019); and remove intrauterine device (2/2023).      URINARY HABITS  Types of symptoms:  Urinary frequency. Leak with large sneeze  Abdominal/Vaginal Pressure complaints: feel bulge or \"does not feel the same in vagina.\"    Urinary Frequency:  q hour  Pad use: no  Nocturia: 4-5     Fluid Intake: 2000 mL  Bladder irritants: no  Post void dribble: yes  Empty bladder just in case: yes  Do you ever leak urine without knowing it? no     BOWEL HABITS  Types of symptoms: Constipation  -  uncomfortable and bloated. History constipation. Returning polyp found with colonscopy.  Bleeding with moving stools  Frequency of bowel movements: 2 days  Stool consistency: [place] Stool Scale:  Can be Type 1 but then next movement is more loose. Strong urge  Do you strain with defecation: Yes   Laxative use: No    SEXUAL HEALTH STATUS    Sexual Nice Status: marital - stopped due to pubic pain  Pain with initial and/or deep penetration: pubic pain  Pain with pelvic exam/tampon use: no    ASSESSMENT  Olesya presents to physical therapy evaluation with primary c/o pubic pain with movement, urinary frequency night and day, paraesthesia in hands at night, post void dribble. The results of the objective tests and measures show limited trunk ROM, high pain at pubic bone, weakness with TrA and gluteals, frequent voiding at day and night.  Functional deficits include but are not limited to dressing, walking, transferring, rolling in bed,  disturbed sleep, straining for BM.  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 Pt and PT discussed evaluation findings, pathology, POC and HEP.  Pt voiced understanding and performs HEP correctly without reported pain. Skilled Pelvic Physical Therapy is medically necessary to address the above impairments and reach functional goals.Patient will benefit from therapy to receive manual therapy for joint and soft tissue manipulation; neuromuscular re-education for pelvic floor coordination and therapeutic exercise for internal and external stretching and functional strengthening. Pt will also benefit from bladder education including urge deferment and bowel movement education    If symptoms continue, recommend PT in postpartum. Patient would also benefit from cervical consult due to neck pain and B paraesthesia at night. Patient not experiencing paraesthesia during the day or with activity as expected CTS. Recommend GI consult to manage constipation. Recommend increase fiber in diet.      OBJECTIVE:   Posture: normal   Pelvic Alignment: equal  Deep Tendon Reflexes:  not tested at this time  Gait: pt ambulates on level ground with antalgia.     External Palpation: high subjective reactivity at pubic bone. Tender MFR boarders of pubic bone L>R. Tender in lumbar p/s B  Scars (location/surgery): not applicable  Abdominal Wall Integrity: Tender at pubic bone  Diastasis Recti: To be tested     Range Of Motion  Lumbar AROM screen: 25% flex, ext, B ROT with pain at pubic bone during flexion and pain in central spine with extension and rotation. SB 50% B  LE AROM screen: grossly WNL except: to be tested     Strength (MMT) 5/5 DEANDRE LE except 4-/5 gluteal   Transverse  Abdominis: 3/5 with pain    Flexibility Summar: To be tested     Special Tests  Positive pain with ASLR test. Unable to raise leg on L        Today's Treatment and Response:   Patient education was provided on objective findings of external and internal evaluation and expectations with treatment outcomes. Educated on bladder normatives and adequate hydration levels    Patient was instructed in and issued a HEP for: TrA contraction application with transfers ( sit to stand, supine to sit, rolling, car transfer) , serola SI belt, diet: fiber/ pears, oatmeal and apple sauce for constipation.    Charges: PT Eval High Complexity, Ex       Total Timed Treatment: 50 min     Total Treatment Time: 50 min     Based on clinical rationale and outcome measures, this evaluation involved High Complexity decision making due to 1-2 personal factors/comorbidities, 4+ body structures involved/activity limitations, and unstable symptoms including pelvic pain and constipation, urinary frequency, paraesthesia in hads  PLAN OF CARE:    Goals: (to be met in 10 visits)  Patient will demonstrate independence in performing home exercise program.  Patient will be independent with donning and doffing Serola SI belt.   Patient will report 50% less pain with sit to stand transfer  Patient will report 50% less pain with bed transfer  Patient will be independent in appropriate bowel habits and movement positions to reduce straining.   Patient will report voiding 1.5 hours during the day with urge deferment techniques  Patient will be independent with good sleeping position for neck and pelvis.    Frequency / Duration: Patient will be seen for 1 x/week or a total of 10 visits over a 90 day period.  Treatment will include: Neuromuscular Re-education, Therapeutic Exercise, and Home Exercise Program instruction     Education or treatment limitation:  end trimester pregnancy  Rehab Potential:good with use of belt      Patient/Family/Caregiver was  advised of these findings, precautions, and treatment options and has agreed to actively participate in planning and for this course of care.    Thank you for your referral. Please co-sign or sign and return this letter via fax as soon as possible to [phone]. If you have any questions, please contact me at Dept: [phone]    Sincerely,  Electronically signed by therapist: Ashanti Parker, PT  Physician's certification required: Yes  I certify the need for these services furnished under this plan of treatment and while under my care.    X___________________________________________________ Date____________________    Certification From: 5/14/2024  To:8/12/2024

## 2024-05-15 NOTE — TELEPHONE ENCOUNTER
On call physician    38 year old  at 35w2d     AMA, h/o LEEP, JAIME, overweight, palpitations, pubic bone pain, carpal tunnel syndrome    Brown vaginal discharge on pantiliner - almost like the end of a period  Not mucus like  No contractions. Has occasional Bereket Bergeron  Not having abdominal pain   +FM  No leaking fluid    No recent intercourse  No trauma (falls, car accidents, etc)  O+    Suspect this may be due to some subtle physiologic cervical change.   Encouraged to drink water, take it easy, observe  If starting to have red bleeding, abdominal pain, increased contractions, or decreased FM should come to L&D for evaluation.     Patient expressed understanding, appreciative.     Ebony Gómez MD

## 2024-05-16 NOTE — PROGRESS NOTES
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)  Pelvic pain affecting pregnancy in third trimester, antepartum (HCC) (O26.893,R10.2)  Disorder of musculoskeletal system during pregnancy (HCC) (O99.891)  Urinary frequency (R35.0)  Feeling of incomplete bladder emptying (R39.14)   Referring Provider: Ebony Gómez  Date of Evaluation:    5/14/2024    Precautions:  Peanuts, tree nuts, latex    35w3d  6/17/2024 due date  Induction scheduled 6/11 Next MD visit:   none scheduled  Date of Surgery: n/a   Insurance Primary/Secondary: [employer] [place] / N/A     # Auth Visits: [id number] auth            Subjective: Patient report today is a good day with less pain with movement. More pain during day when previous day pushed through pain to do a lot of activity.    Pain: 4/10      Objective:   Patient was instructed in HEP and issued a handout. Patient demonstrate correct technique with HEP.    PF releases 1 joint level with external releases    Assessment: Patient tolerated and responded well to modify exercises. Good pelvic floor releases in SL with external MFR      Goals:    (to be met in 10 visits)  Patient will demonstrate independence in performing home exercise program.  Patient will be independent with donning and doffing Serola SI belt.   Patient will report 50% less pain with sit to stand transfer  Patient will report 50% less pain with bed transfer  Patient will be independent in appropriate bowel habits and movement positions to reduce straining.   Patient will report voiding 1.5 hours during the day with urge deferment techniques  Patient will be independent with good sleeping position for neck and pelvis.    Plan: Continue PT to progress force closure, pelvic floor external stretching, gentle stretching. Patient to start wearing Serola SI belt at home but hold if pain increased with belt. Bring belt to PT.   Date: 5/16/2024  TX#: 2/8 auth/10 POC Date:                 TX#: 3/ Date:                 TX#: 4/ Date:                 TX#: 5/  Date:   Tx#: 6/   TE  28 min  Cross leg stretch 30 sec x 3 B  LTR 10 sec x 5  Upper trunk rotation 10 sec x 5 Hip add brent  TrA table top        SL clams 2 x 10  Bridge x 10 with belt when arrive  Issue HO in iovox roni                MT  1 5min  MFR IO/PF 4-5 releases       HEP: 5/14/2024  TrA contraction application with transfers ( sit to stand, supine to sit, rolling, car transfer) , serola SI belt, diet: fiber/ pears, oatmeal and apple sauce for constipation.  5/16/2024   Access Code: [email or web address]  URL: [email or web address]  Date: 05/16/2024  Prepared by: Ashanti Parker    Exercises  - Supine Lower Trunk Rotation  - 2 x daily - 7 x weekly - 2 sets - 10 reps  - Supine Piriformis Stretch Pulling Heel to Hip  - 2 x daily - 7 x weekly - 2 sets - 10 reps  5/16/2024   Exercises  - Supine Lower Trunk Rotation  - 2 x daily - 7 x weekly - 2 sets - 10 reps  - Supine Piriformis Stretch Pulling Heel to Hip  - 2 x daily - 7 x weekly - 2 sets - 10 reps  - Supine Bridge  - 2 x daily - 7 x weekly - 2 sets - 10 reps  - Clamshell  - 2 x daily - 7 x weekly - 2 sets - 10 reps  - Sidelying Thoracic Rotation  - 2 x daily - 7 x weekly - 2 sets - 10 reps    Charges: MT, TE2        Total Timed Treatment: 45 min  Total Treatment Time: 45 min  MUSCULOSKELETAL AND PELVIC FLOOR EVALUATION:     Diagnosis:   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 Pain is worse with  turning, dressing, transferring positions, rolling in bed. Waking to void 4-5 times a night. Intermittent back soreness. Have to assist L leg to get into car. Urinary frequency greater with this pregnancy both day and night. Dealing with CTS in pregnancy and feeling numbness in hands. Having neck pain.     Induction scheduled for 6/11.      Pt describes pain level: current 7/10, best 3/10, worst 8/10.  Pulsating pain. Deny numbness and tingling.   Worse  rolling, walking ( back pain), car transfers, lifting L leg for don/doff clothing and bathing,transition from rest  Better  keeping busy to no think about pain, sitting, lying down      Pregnant Now: Yes, induction scheduled 6/11  Obstetrical/Gynecological history:  G 5, P 4, T 4 , L 4  - All vaginals deliveries with episiotomy.  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 Did not have same pain with pregnancy. Voiding frequency and constipation present before . Pt goals include less pain with transferring positions.  Past medical history was reviewed with Olesya. 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    Patient  has a past surgical history that includes colonoscopy (2018, 2021); leep (2009); implantable breast prosthesis; insert intrauterine device (2019); and remove intrauterine device (2/2023).      URINARY HABITS  Types of symptoms:  Urinary frequency. Leak with large sneeze  Abdominal/Vaginal Pressure complaints: feel bulge  or \"does not feel the same in vagina.\"    Urinary Frequency:  q hour  Pad use: no  Nocturia: 4-5     Fluid Intake: 2000 mL  Bladder irritants: no  Post void dribble: yes  Empty bladder just in case: yes  Do you ever leak urine without knowing it? no     BOWEL HABITS  Types of symptoms: Constipation  -  uncomfortable and bloated. History constipation. Returning polyp found with colonscopy.  Bleeding with moving stools  Frequency of bowel movements: 2 days  Stool consistency: [place] Stool Scale:  Can be Type 1 but then next movement is more loose. Strong urge  Do you strain with defecation: Yes   Laxative use: No    SEXUAL HEALTH STATUS    Sexual [place] Status: marital - stopped due to pubic pain  Pain with initial and/or deep penetration: pubic pain  Pain with pelvic exam/tampon use: no    ASSESSMENT  Olesya presents to physical therapy evaluation with primary c/o pubic pain with movement, urinary frequency night and day, paraesthesia in hands at night, post void dribble. The results of the objective tests and measures show limited trunk ROM, high pain at pubic bone, weakness with TrA and gluteals, frequent voiding at day and night.  Functional deficits include but are not limited to dressing, walking, transferring, rolling in bed, disturbed sleep, straining for BM.  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 Pt and PT discussed evaluation findings, pathology, POC and HEP.  Pt voiced understanding and performs HEP correctly without reported pain. Skilled Pelvic Physical Therapy is medically necessary to address the above impairments and reach functional goals.Patient will benefit from therapy to receive manual therapy for joint and soft tissue  manipulation; neuromuscular re-education for pelvic floor coordination and therapeutic exercise for internal and external stretching and functional strengthening. Pt will also benefit from bladder education including urge deferment and bowel movement education    If symptoms continue, recommend PT in postpartum. Patient would also benefit from cervical consult due to neck pain and B paraesthesia at night. Patient not experiencing paraesthesia during the day or with activity as expected CTS. Recommend GI consult to manage constipation. Recommend increase fiber in diet.      OBJECTIVE:   Posture: normal   Pelvic Alignment: equal  Deep Tendon Reflexes:  not tested at this time  Gait: pt ambulates on level ground with antalgia.     External Palpation: high subjective reactivity at pubic bone. Tender MFR boarders of pubic bone L>R. Tender in lumbar p/s B  Scars (location/surgery): not applicable  Abdominal Wall Integrity: Tender at pubic bone  Diastasis Recti: To be tested     Range Of Motion  Lumbar AROM screen: 25% flex, ext, B ROT with pain at pubic bone during flexion and pain in central spine with extension and rotation. SB 50% B  LE AROM screen: grossly WNL except: to be tested     Strength (MMT) 5/5 DEANDRE LE except 4-/5 gluteal   Transverse Abdominis: 3/5 with pain    Flexibility Summar: To be tested     Special Tests  Positive pain with ASLR test. Unable to raise leg on L        Today's Treatment and Response:   Patient education was provided on objective findings of external and internal evaluation and expectations with treatment outcomes. Educated on bladder normatives and adequate hydration levels    Patient was instructed in and issued a HEP for: TrA contraction application with transfers ( sit to stand, supine to sit, rolling, car transfer) , serola SI belt, diet: fiber/ pears, oatmeal and apple sauce for constipation.    Charges: PT Eval High Complexity, Ex       Total Timed Treatment: 50 min     Total  Treatment Time: 50 min     Based on clinical rationale and outcome measures, this evaluation involved High Complexity decision making due to 1-2 personal factors/comorbidities, 4+ body structures involved/activity limitations, and unstable symptoms including pelvic pain and constipation, urinary frequency, paraesthesia in hads  PLAN OF CARE:    Goals: (to be met in 10 visits)  Patient will demonstrate independence in performing home exercise program.  Patient will be independent with donning and doffing Serola SI belt.   Patient will report 50% less pain with sit to stand transfer  Patient will report 50% less pain with bed transfer  Patient will be independent in appropriate bowel habits and movement positions to reduce straining.   Patient will report voiding 1.5 hours during the day with urge deferment techniques  Patient will be independent with good sleeping position for neck and pelvis.    Frequency / Duration: Patient will be seen for 1 x/week or a total of 10 visits over a 90 day period.  Treatment will include: Neuromuscular Re-education, Therapeutic Exercise, and Home Exercise Program instruction     Education or treatment limitation:  end trimester pregnancy  Rehab Potential:good with use of belt      Patient/Family/Caregiver was advised of these findings, precautions, and treatment options and has agreed to actively participate in planning and for this course of care.    Thank you for your referral. Please co-sign or sign and return this letter via fax as soon as possible to [phone]. If you have any questions, please contact me at Dept: [phone]    Sincerely,  Electronically signed by therapist: Ashanti Parker, PT  Physician's certification required: Yes  I certify the need for these services furnished under this plan of treatment and while under my care.    X___________________________________________________ Date____________________    Certification From: 5/14/2024  To:8/12/2024

## 2024-05-17 NOTE — PROGRESS NOTES
Patient came in with her 5-year old daughter. Explained to the patient that we strictly follow rules that nobody under 17 y/o is allowed in the [facility]. Patient also spoke with Clinical Leader, Pretty Foster RN and explained this as well. Patient verbalized understanding. Will reschedule this appointment.
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
## 2024-05-29 NOTE — PROGRESS NOTES
Education Record    Learner:  Patient    Disease / Diagnosis: here for venofer    Barriers / Limitations:  None    Method:  Brief focused, printed material and  reinforcement    General Topics:  Plan of care reviewed    Outcome:  patient ambulatory. No complaints. Tolerated infusion. Medication reviewed with patient. Made her appts with the . Aware she needs 5 doses total every 2 days. Shows understanding. Discharged in stable condition.
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
## 2024-05-31 NOTE — PROGRESS NOTES
Education Record    Learner:  Patient    Disease / Diagnosis:pt here for venofer    Barriers / Limitations:  None    Method:  Brief focused, printed material and  reinforcement    General Topics:  Plan of care reviewed    Outcome: pt tolerated infusion with no c/o

## 2024-06-03 NOTE — PROGRESS NOTES
Education Record    Learner:  Patient    Disease / Diagnosis: here for venofer    Barriers / Limitations:  None    Method:  Brief focused, printed material and  reinforcement    General Topics:  Plan of care reviewed    Outcome: patient ambulatory. no complaints. Tolerated infusion.  Has next appts. Shows understanding. Discharged in stable condition
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
## 2024-06-04 NOTE — PROGRESS NOTES
Telephone call through answering service.  Patient 38 weeks pregnant.  Had pink discharge a few days ago.  This evening he had a small amount of bright red blood when wiping.  Good fetal movement.  No leaking fluid.  No regular contractions.  No abdominal pain.  Advised to continue monitoring.  If persistent bright red blood, abdominal pain, regular uterine contractions or labor, decreased fetal movement, leaking fluid, patient to come immediately to [facility] labor and delivery

## 2024-06-05 NOTE — PROGRESS NOTES
Education Record     Learner:  Patient     Disease / Diagnosis: iron infusion     Barriers / Limitations:  none                Comments:     Method:  Discussion                Comments:     General Topics:  Plan of care reviewed                Comments:     Outcome:  Shows understanding                Comments:  Pt tolerated IV iron. Pt dc home ambulatory in stable condition, no complaints. Has fu appts.
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
## 2024-06-07 NOTE — PROGRESS NOTES
Education Record    Learner:  Patient    Disease / Diagnosis: here for venofer    Barriers / Limitations:  None    Method:  Brief focused, printed material and  reinforcement    General Topics:  Plan of care reviewed    Outcome:  patient ambulatory. No complaints. Tolerated infusion. Last dose today. Shows understanding. Discharged in stable condition

## 2024-06-11 NOTE — L&D DELIVERY NOTE
Dov, Girl [[id number]]      Labor Events     labor?: No   steroids?: None  Antibiotics received during labor?: No  Rupture date/time: 2024 0938     Rupture type: AROM  Fluid color: Clear  Labor type: Induced Onset of Labor  Induction: AROM, Oxytocin  Indications for induction: Other - comment  Induction comment: AMA  Intrapartum & labor complications: Other - see comments  Intrapartum & labor complications comment: AMA       Labor Event Times    Labor onset date/time: 2024 0921  Dilation complete date/time: 2024 1241  Start pushing date/time: 2024 1334        Presentation    Presentation: Vertex  Position: Right Occiput Anterior       Operative Delivery    Operative Vaginal Delivery: No                Shoulder Dystocia    No data filed       Anesthesia    Method: Epidural              [place] Delivery      Head delivery date/time: 2024 13:45:54   Delivery date/time:  24 13:46:12   Delivery type: Normal spontaneous vaginal delivery    Details:     Delivery location: other hospital unit outside of L&D       Delivery Providers    Delivering Clinician: Ebony Góemz MD   Delivery personnel:  Provider Role   Ebony Borges, DEJAH Baby Nurse   Arcelia Rouse RN Delivery Nurse             Cord    Vessels: 3 Vessels  Complications: None  Timed cord clamping: Yes  Time in sec: 60  Cord blood disposition: to lab  Gases sent?: No       Resuscitation    Method: None       [place]tis Measurements      Weight: 3370 g 7 lb 6.9 oz Length: 49.5 cm     Head circum.: 34 cm Chest circum.: 32.5 cm      Abdominal circum.: 31 cm           Placenta    Date/time: 2024 1354  Removal: Spontaneous  Appearance: Intact  Disposition: held for future pathology       Apgars    Living status: Living   Apgar Scoring Key:    0 1 2    Skin color Blue or pale Acrocyanotic Completely pink    Heart rate Absent <100 bpm >100 bpm    Reflex irritability No response Grimace Cry or active  withdrawal    Muscle tone Limp Some flexion Active motion    Respiratory effort Absent Weak cry; hypoventilation Good, crying              1 Minute:  5 Minute:  10 Minute:  15 Minute:  20 Minute:      Skin color: 1  1       Heart rate: 2  2       Reflex irritablity: 2  2       Muscle tone: 2  2       Respiratory effort: 2  2       Total: 9  9          Apgars assigned by: EBONY CALLE RN  [place] disposition: with mother       Skin to Skin    No data filed       Vaginal Count    Initial count RN: Arcelia Rouse RN  Initial count Tech: Onofre, Jessica   Sponges   Sharps    Initial counts 11   0    Final counts 11   0    Final count RN: Arcelia Rouse RN  Final count MD: Ebony Gómez MD       Lacerations    Episiotomy: None  Perineal lacerations: 2nd Repaired?: No     Periurethral laceration: bilateral Repaired?: No     Vaginal laceration?: No      Cervical laceration?: No    Clitoral laceration?: No    Quantitative blood loss (mL): 144              38 year old  at 39w1d was admitted for IOL for AMA. IV oxytocin, amniotomy, epidural. She progressed to complete. Pushed with good efforts. Delivered a viable female infant over an intact perineum via vertex presentation SWAPNA position. Delayed cord clamping was performed. Cord blood obtained. IV oxytocin administered. Placenta expressed apparently intact. Appeared to have some mild calcifications so it was sent to pathology.     Uterine tone was good. Perineum inspected. Laceration(s): small 2nd degree perineal laceration hemostatic with good natural re-approximation, not repaired. Bilateral periurethral lacerations hemostatic, not repaired. See Delivery report for QBL or EBL.      Ebony Gómez MD

## 2024-06-11 NOTE — PROGRESS NOTES
Pt is a 38 year old female admitted to -A.     Chief Complaint   Patient presents with    Scheduled Induction      Pt is  39w1d intra-uterine pregnancy.  History obtained, consents signed. Oriented to room, staff, and plan of care.

## 2024-06-11 NOTE — ANESTHESIA PROCEDURE NOTES
Labor Analgesia    Date/Time: 6/11/2024 11:15 AM    Performed by: Leonardo Colorado MD  Authorized by: Leonardo Colorado MD      General Information and Staff    Start Time:  6/11/2024 11:15 AM  End Time:  6/11/2024 11:24 AM  Anesthesiologist:  Leonardo Colorado MD  Performed by:  Anesthesiologist  Patient Location:  OB  Site Identification: surface landmarks    Reason for Block: labor epidural    Preanesthetic Checklist: patient identified, IV checked, risks and benefits discussed, monitors and equipment checked, pre-op evaluation, timeout performed, IV bolus, anesthesia consent and sterile technique used      Procedure Details    Patient Position:  Sitting  Prep: ChloraPrep    Monitoring:  Heart rate and continuous pulse ox  Approach:  Midline    Epidural Needle    Injection Technique:   Needle Type:  Tuohy  Needle Gauge:  17 G  Needle Length:  3.375 in  Needle Insertion Depth:  5  Location:  L3-4    Spinal Needle      Catheter    Catheter Type:  End hole  Catheter Size:  19 G  Test Dose:  Negative    Assessment    Sensory Level:  T8    Additional Comments
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
## 2024-06-11 NOTE — PROGRESS NOTES
ADMISSION NOTE    Patient admitted to [facility] baby room 2216.Oriented to room  Safety precautions initiated. Bed in low position  Call light within reach. Report received from [facility] DEJAH  Plan of care reviewed with pt and SO

## 2024-06-11 NOTE — PROGRESS NOTES
Pt transferred to [facility] in room 2216. Vital signs stable on transfer. All belongings sent with patient. Baby ID bands matched and verified with parents. Report given to Heidi POND

## 2024-06-11 NOTE — ANESTHESIA PREPROCEDURE EVALUATION
PRE-OP EVALUATION    Patient Name: Olesya Monae    Admit Diagnosis: PREGNANACY  Pregnancy (HCC)    Pre-op Diagnosis: * No pre-op diagnosis entered *        Anesthesia Procedure: LABOR ANALGESIA    * No surgeons found in log *    Pre-op vitals reviewed.  Temp: 98.5 °F (36.9 °C)  Pulse: 80  Resp: 16  BP: 112/64     Body mass index is 29.29 kg/m².    Current medications reviewed.  Hospital Medications:   lactated ringers infusion   Intravenous Continuous    dextrose in lactated ringers 5% infusion   Intravenous PRN    lactated ringers IV bolus 500 mL  500 mL Intravenous PRN    acetaminophen (Tylenol Extra Strength) tab 500 mg  500 mg Oral Q6H PRN    acetaminophen (Tylenol Extra Strength) tab 1,000 mg  1,000 mg Oral Q6H PRN    ibuprofen (Motrin) tab 600 mg  600 mg Oral Once PRN    ondansetron (Zofran) 4 MG/2ML injection 4 mg  4 mg Intravenous Q6H PRN    oxyTOCIN in sodium chloride 0.9% (Pitocin) 30 Units/500mL infusion premix  62.5-900 birdie-units/min Intravenous Continuous    terbutaline (Brethine) 1 MG/ML injection 0.25 mg  0.25 mg Subcutaneous PRN    sodium citrate-citric acid (Bicitra) 500-334 MG/5ML oral solution 30 mL  30 mL Oral PRN    oxyTOCIN in sodium chloride 0.9% (Pitocin) 30 Units/500mL infusion premix  0.5-20 birdie-units/min Intravenous Continuous    lactated ringers IV bolus 1,000 mL  1,000 mL Intravenous Once    fentaNYL-bupivacaine 2 mcg/mL-0.125% in sodium chloride 0.9% 100 mL EPIDURAL infusion premix  12 mL/hr Epidural Continuous    [COMPLETED] fentaNYL (Sublimaze) 50 mcg/mL injection 100 mcg  100 mcg Epidural Once    EPHEDrine (PF) 25 MG/5 ML injection 5 mg  5 mg Intravenous PRN    nalbuphine (Nubain) 10 mg/mL injection 2.5 mg  2.5 mg Intravenous Q15 Min PRN    lidocaine PF (Xylocaine-MPF) 1% injection   Infiltration PRN    lidocaine 1.5%-EPINEPHrine 1:200,000 (Xylocaine-Epinephrine) injection   Epidural PRN       Outpatient Medications:     Medications Prior to Admission   Medication Sig Dispense  Refill Last Dose    cyanocobalamin 1000 MCG Oral Tab Take 1 tablet (1,000 mcg total) by mouth daily. 90 tablet 3 2024    Magnesium 500 MG Oral Tab Take by mouth.   2024    Prenatal MV-Min-Fe Fum-FA-DHA (PRENATAL 1 OR) Take by mouth.   2024    NON FORMULARY Inject as directed. Iron injection every 2 days       [] cefadroxil 500 MG Oral Cap Take 1 capsule (500 mg total) by mouth 2 (two) times daily for 5 days. 10 capsule 0     [] azithromycin 500 MG Oral Tab Take 2 tablets (1,000 mg total) by mouth once for 1 dose. 2 tablet 0     [] metRONIDAZOLE 500 MG Oral Tab Take 1 tablet (500 mg total) by mouth 2 (two) times daily for 7 days. No alcohol or intercourse during treatment and for 24 hr after last dose. 14 tablet 0     metoclopramide (REGLAN) 10 MG Oral Tab Take 1 tablet (10 mg total) by mouth every 6 (six) hours as needed. 20 tablet 0        Allergies: Latex, Peanuts, and Tree nuts      Anesthesia Evaluation    Patient summary reviewed.    Anesthetic Complications           GI/Hepatic/Renal                                 Cardiovascular                (+) obesity                                       Endo/Other                                  Pulmonary                           Neuro/Psych                                      Past Surgical History:   Procedure Laterality Date    Colonoscopy  2021    indic: polyps    Implantable breast prosthesis      Insert intrauterine device  2019    Leep  2009    Remove intrauterine device  2023     Social History     Socioeconomic History    Marital status:    Tobacco Use    Smoking status: Never    Smokeless tobacco: Never   Substance and Sexual Activity    Alcohol use: Not Currently    Drug use: Not Currently     Types: Cannabis    Sexual activity: Yes     Partners: Male     Birth control/protection: I.U.D., Mirena   Other Topics Concern    Caffeine Concern Yes     Comment: 1x cup daily    Exercise No    Seat Belt Yes      History   Drug Use Unknown     Available pre-op labs reviewed.  Lab Results   Component Value Date    WBC 5.0 06/11/2024    RBC 4.02 06/11/2024    HGB 12.8 06/11/2024    HCT 37.5 06/11/2024    MCV 93.3 06/11/2024    MCH 31.8 06/11/2024    MCHC 34.1 06/11/2024    RDW 14.0 06/11/2024    .0 06/11/2024     Lab Results   Component Value Date     04/30/2024    K 3.5 04/30/2024     04/30/2024    CO2 21.0 04/30/2024    BUN 3 (L) 04/30/2024    CREATSERUM 0.41 (L) 04/30/2024    GLU 78 04/30/2024    CA 9.9 04/30/2024            Airway      Mallampati: I  Mouth opening: >3 FB  TM distance: > 6 cm  Neck ROM: full Cardiovascular    Cardiovascular exam normal.  Rhythm: regular  Rate: normal     Dental             Pulmonary    Pulmonary exam normal.                 Other findings              ASA: 2 and emergent  Plan: epidural  NPO status verified and patient meets guidelines.          Plan/risks discussed with: patient and spouse                Present on Admission:   Pregnancy ([facility])   AMA (advanced maternal age) multigravida 35+, third trimester ([facility])   Encounter for induction of labor ([facility])

## 2024-06-12 NOTE — PLAN OF CARE
Problem: ANXIETY  Goal: Will report anxiety at manageable levels  Description: INTERVENTIONS:  - Administer medication as ordered  - Teach and rehearse alternative coping skills  - Provide emotional support with 1:1 interaction with staff  Outcome: Progressing     Problem: POSTPARTUM  Goal: Long Term Goal:Experiences normal postpartum course  Description: INTERVENTIONS:  - Assess and monitor vital signs and lab values.  - Assess fundus and lochia.  - Provide ice/sitz baths for perineum discomfort.  - Monitor healing of incision/episiotomy/laceration, and assess for signs and symptoms of infection and hematoma.  - Assess bladder function and monitor for bladder distention.  - Provide/instruct/assist with pericare as needed.  - Provide VTE prophylaxis as needed.  - Monitor bowel function.  - Encourage ambulation and provide assistance as needed.  - Assess and monitor emotional status and provide social service/psych resources as needed.  - Utilize standard precautions and use personal protective equipment as indicated. Ensure aseptic care of all intravenous lines and invasive tubes/drains.  - Obtain immunization and exposure to communicable diseases history.  Outcome: Progressing  Goal: Optimize infant feeding at the breast  Description: INTERVENTIONS:  - Initiate breast feeding within first hour after birth.   - Monitor effectiveness of current breast feeding efforts.  - Assess support systems available to mother/family.  - Identify cultural beliefs/practices regarding lactation, letdown techniques, maternal food preferences.  - Assess mother's knowledge and previous experience with breast feeding.  - Provide information as needed about early infant feeding cues (e.g., rooting, lip smacking, sucking fingers/hand) versus late cue of crying.  - Discuss/demonstrate breast feeding aids (e.g., infant sling, nursing footstool/pillows, and breast pumps).  - Encourage mother/other family members to express  feelings/concerns, and actively listen.  - Educate father/SO about benefits of breast feeding and how to manage common lactation challenges.  - Recommend avoidance of specific medications or substances incompatible with breast feeding.  - Assess and monitor for signs of nipple pain/trauma.  - Instruct and provide assistance with proper latch.  - Review techniques for milk expression (breast pumping) and storage of breast milk. Provide pumping equipment/supplies, instructions and assistance, as needed.  - Encourage rooming-in and breast feeding on demand.  - Encourage skin-to-skin contact.  - Provide LC support as needed.  - Assess for and manage engorgement.  - Provide breast feeding education handouts and information on community breast feeding support.   Outcome: Progressing  Goal: Establishment of adequate milk supply with medication/procedure interruptions  Description: INTERVENTIONS:  - Review techniques for milk expression (breast pumping).   - Provide pumping equipment/supplies, instructions, and assistance until it is safe to breastfeed infant.  Outcome: Progressing  Goal: Experiences normal breast weaning course  Description: INTERVENTIONS:  - Assess for and manage engorgement.  - Instruct on breast care.  - Provide comfort measures.  Outcome: Progressing  Goal: Appropriate maternal -  bonding  Description: INTERVENTIONS:  - Assess caregiver- interactions.  - Assess caregiver's emotional status and coping mechanisms.  - Encourage caregiver to participate in  daily care.  - Assess support systems available to mother/family.  - Provide /case management support as needed.  Outcome: Progressing

## 2024-06-12 NOTE — PROGRESS NOTES
Labor Analgesia Follow Up Note    Patient underwent epidural anesthesia for labor analgesia,    Placenta Date/Time: 6/11/2024  1:54 PM     Delivery Date/Time:: 6/11/2024   1:46 PM     /56 (BP Location: Right arm)   Pulse 61   Temp 98.2 °F (36.8 °C) (Oral)   Resp 18   Wt 79.8 kg (176 lb)   LMP 09/11/2023 (Exact Date)   SpO2 98%   Breastfeeding No   BMI 29.29 kg/m²     Assessment:  Patient seen and no apparent anesthesia related complications.    Thank you for asking us to participate in the care of your patient.

## 2024-06-12 NOTE — PROGRESS NOTES
PPD#1  Patient has no complaints, lochia minimal    /60 (BP Location: Right arm)   Pulse 74   Temp 97.8 °F (36.6 °C) (Oral)   Resp 16   Wt 176 lb (79.8 kg)   LMP 2023 (Exact Date)   SpO2 98%   Breastfeeding No   BMI 29.29 kg/m²     Recent Labs   Lab 24  0814 24  0731   RBC 4.02 3.71*   HGB 12.8 11.8*   HCT 37.5 35.3   MCV 93.3 95.1   MCH 31.8 31.8   MCHC 34.1 33.4   RDW 14.0 14.2   NEPRELIM 3.16 5.59   WBC 5.0 7.7   .0 159.0       Abdomen: soft, NT/ND  Uterus: firm, below umbilicus    A/P: s/p   - doing well  - plan home tomorrow

## 2024-06-13 NOTE — PLAN OF CARE
Problem: ANXIETY  Goal: Will report anxiety at manageable levels  Description: INTERVENTIONS:  - Administer medication as ordered  - Teach and rehearse alternative coping skills  - Provide emotional support with 1:1 interaction with staff  Outcome: Adequate for Discharge     Problem: POSTPARTUM  Goal: Long Term Goal:Experiences normal postpartum course  Description: INTERVENTIONS:  - Assess and monitor vital signs and lab values.  - Assess fundus and lochia.  - Provide ice/sitz baths for perineum discomfort.  - Monitor healing of incision/episiotomy/laceration, and assess for signs and symptoms of infection and hematoma.  - Assess bladder function and monitor for bladder distention.  - Provide/instruct/assist with pericare as needed.  - Provide VTE prophylaxis as needed.  - Monitor bowel function.  - Encourage ambulation and provide assistance as needed.  - Assess and monitor emotional status and provide social service/psych resources as needed.  - Utilize standard precautions and use personal protective equipment as indicated. Ensure aseptic care of all intravenous lines and invasive tubes/drains.  - Obtain immunization and exposure to communicable diseases history.  Outcome: Adequate for Discharge     Problem: POSTPARTUM  Goal: Experiences normal breast weaning course  Description: INTERVENTIONS:  - Assess for and manage engorgement.  - Instruct on breast care.  - Provide comfort measures.  Outcome: Adequate for Discharge     Problem: POSTPARTUM  Goal: Appropriate maternal -  bonding  Description: INTERVENTIONS:  - Assess caregiver- interactions.  - Assess caregiver's emotional status and coping mechanisms.  - Encourage caregiver to participate in  daily care.  - Assess support systems available to mother/family.  - Provide /case management support as needed.  Outcome: Adequate for Discharge

## 2024-06-13 NOTE — PROGRESS NOTES
Patient discharged in stable condition via wheelchair accompanied by spouse. Escorted by staff member. Discharge instructions given, pt verbalizes understanding.

## 2024-06-15 NOTE — PROGRESS NOTES
Reviewed self and infant care w / mom, she verbalizes understanding of instructions reviewed. Encourage to follow up w/ MDs as directed and w/ questions/concerns. Bottlefding, br care reviewed, has occas back pain, \"like a contraction and SOB for a few seconds,\" enc emptying her bladder more freq and to contact OB if this persists, occas taking motrin PRN. Hx of PPD but happy now w newest daughter, denies ppd or bb but care reviewed.

## 2024-06-21 NOTE — TELEPHONE ENCOUNTER
We do not have an opening at that time unfortunately  I would recommend reaching out to OB. If the clearance is needed because of recently giving birth, that really should come from her OB actually
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
## 2024-06-21 NOTE — PATIENT INSTRUCTIONS
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 She subsequently completed her Doctor of Physical Therapy from [employer] in 2013. Melody’s experience is primarily with orthopedics, but also has experience in pediatrics as well.     Melody has been a certified provider of the [employer] instrument-assisted soft tissue mobilization since 2015 & has further earned the title of [employer] Specialist in 2020. Melody is continuing her passion for learning by pursuing training through the [facility] to effectively treat patients with pelvic floor related disorders. 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    Melody thrives on treating patients with empathy & compassion to provide the individualized care that all patients need & deserve. Melody aims to empower patients with the tools to feel in charge of their recovery, knowing they can advocate for themselves & achieve maximum success when they understand the treatments that work best for them.    When Melody is not at work, she enjoys exercising with barre classes, scenic walks outside, baking, & traveling with her family.     Melody is accepting new patients at the [facility]. To schedule or change an appointment, call [phone]. To speak with Melody, call [phone] or message on [employer].
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
## 2024-07-10 NOTE — PROGRESS NOTES
Diagnosis:   Neck sprain , initial encounter , Left hip pain , acute pain of left shoulder      Referring Provider: Shelli Mcdermott  Date of Evaluation:    6/21/2024    Precautions:  Latex Allergy, allergy to Peanuts and Tree Nuts , Carries Epi Pen Next MD visit:   none scheduled  Date of Surgery: n/a   Insurance Primary/Secondary: [employer] [place] HMO / N/A     # Auth Visits: [id number]            Subjective: \" My neck is feeling very stiff and I continue to feel tingling sensation in my left neck radiating down to my left arm .\"    Pain: 7 /10 ( left side of neck and left shoulder )      Objective:       Assessment: patient was issued HEP and was instructed on proper form of performance .Patient returned correct demo.      Goals: ( to be met in 12 visits ) with additional Goals to be determined for L hip upon examination at subsequent session.  1.Pt will report ability to sleep without walking due to shoulder pain .  2.Pt will report overall pain at worst 4/10 for improved ability to tolerate sitting on toilet & standing from sitting .  3.Pt will improve cervical AROM flexion to 50 degrees to improve tolerance for bending down to  items from the floor .  4.Pt will improve bilateral cervical AROM rotation to 70 degrees to improve tolerance for turning head to check bind spot while driving & for conversation with family .  5.Pt will improve shoulder flexion AROM to 150 degrees for washing hair & dressing .  6.PT will be independent and compliant with comprehensive HEP to maintain progress achieved in PT .    Plan: continue PT and progress patient towards current goals as able .  Date: 7/10/2024  TX#: 2/12 Date:                 TX#: 3/ Date:                 TX#: 4/ Date:                 TX#: 5/ Date:   Tx#: 6/   Manual Therapy x 10 min :  STM to B cervical paraspinals and L UT        Therapeutic Ex x 30 min :  Supine :  Cervical retraction AROM 2 x 10   Cervical retraction with self OP 2 x 10   PROM of L shoulder with gentle  end range stretch in all planes x 7 min   AAROM with cane assist :  Shoulder flexion 2 x 10   Chest press 2 x 10   ER 2 x 10   Seated :  Cervical retraction AROM 2 x 10   Cervical retraction with self OP 2 x 10   Cervical AROM of R/L SB 2 x 10   Cervical AROM of cervical rotation 2 x 10   Standing ;  B rows with YTB x 10  B sh ext with YTB x 10   L ER/ IR with YTB x 10                     HEP:   ( 7/10/24 ) supine : cervical retraction x 10 , seated : cervical retraction AROM X 10 , cervical retraction with self OP x 10 , scapula retraction x 10 , rows with YTB x 10 , B sh ext with YTB X 10 to be performed 1-2 times a day .    Charges: manual there x 1 , therapeutic ex x 2        Total Timed Treatment: 40 min  Total Treatment Time: 45 min

## 2024-07-29 NOTE — PATIENT INSTRUCTIONS
Miralax daily   Fiber metamucil, citrucel, psyllium    [facility]    [address], [address], [place] [address]. Ph: [phone]  [address], [address], [address] [place] [address]. Ph: [phone] & [phone]  [address], [place] [address]. Ph [phone]  [address], [place] [address]. Ph [phone]  [address] [place] [address]. Ph [phone]  [address] [place] [address]. Ph [phone]     The Role of Physical Therapy in the Treatment of Pelvic Floor Dysfunction:    Physical therapists are trained to evaluate and treat dysfunctions in the joints, muscles, nerves and scar. Physical therapists specifically trained in the area of pelvic health can identify the possible musculoskeletal causes of pelvic pain, bladder and bowel difficulties and develop a treatment plan specific to the individual suffering from this difficulties.     What to expect at your first physical therapy appointment:   Your first visit will include an initial evaluation in a comfortable, private room by a therapist who has undergone advanced education and training in the evaluation and treatment of pelvic muscle dysfunction. The therapist will obtain a detailed history of your health, pain and activity limitations. She will also ask you about any bowel, bladder and sexual difficulties as these are in part controlled by the pelvic muscles. The therapist will then take a look at your posture, mobility of your spine and hips and strength and flexibility of pelvic girdle muscles. She will examine any scar tissue and trigger points in the muscles of your pelvic region.     The therapist will also specifically examine the pelvic floor muscles. Your pelvic floor consists of a group of muscles that attach behind the pubic bone in the front to the tail bone in the back. They are responsible for providing support to the pelvic joints and organs, relaxing to allow the  passage of urine, stool and gas and parht to prevent the loss of urine, stool and gas as appropriate. In order to best examine these muscles you will be asked to undress from the waist down and be covered with a sheet. The therapist will use a lubricated, gloved finger to identify painful muscles around and in your vagina or rectum then instruct you to contract and relax these muscles in order to determine how the muscles are functioning. Care is taken to make you as comfortable as possible with the exam.     Your therapist will discuss the evaluation results with you and provide you with education regarding your specific condition and the expectation of therapy. She will answer all of your questions and will work with you to establish a treatment plan based on the results of the evaluation and your goals for therapy.       Patient Information about GARDASIL®9 (pronounced “hefo-Zu-jssp n?n”)  (Human Papillomavirus 9-valent Vaccine, Recombinant)    IT IS RECOMMENDED THAT YOU CHECK WITH YOUR INSURANCE ABOUT COVERAGE FOR THIS VACCINE PRIOR TO ITS ADMINISTRATION    Read this information with care before getting GARDASIL®9. You or your child (the person getting  GARDASIL 9) will need 2 or 3 doses of the vaccine, depending on how old you are. It is important to read  this information before getting each dose. This information does not take the place of talking with your  health care professional about GARDASIL 9.    What is GARDASIL 9?    GARDASIL 9 is a vaccine (injection/shot) given to individuals 9 through 45 years of age to help protect  against diseases caused by some types of Human Papillomavirus (HPV).    What diseases can GARDASIL 9 help protect against?    In girls and women 9 through 45 years of age, GARDASIL 9 helps protect against:  ? Cervical cancer  ? Vulvar and vaginal cancers  ? Anal cancer  ? Certain head and neck cancers, such as throat and back of mouth cancers  ? Precancerous cervical, vulvar,  vaginal and anal lesions  ? Genital warts  In boys and men 9 through 45 years of age, GARDASIL 9 helps protect against:  ? Anal cancer  ? Certain head and neck cancers, such as throat and back of mouth cancers  ? Precancerous anal lesions  ? Genital warts    These diseases may have many causes, including HPV Types 6, 11, 16, 18, 31, 33, 45, 52, and 58.  GARDASIL 9 only protects against diseases caused by these nine types of HPV.  People cannot get HPV or any of these diseases from GARDASIL 9.    What important information about GARDASIL 9 should I know?    GARDASIL 9:  ? Does not remove the need for screening for cervical, vulvar, vaginal, anal, and certain head and  neck cancers, such as throat and back of mouth cancers as recommended by a health care  professional; women should still get routine cervical cancer screening.  ? Does not protect the person getting GARDASIL 9 from a disease that is caused by other types of  HPV, other viruses or bacteria.  ? Does not treat HPV infection.  ? Does not protect the person getting GARDASIL 9 from HPV types that he/she may already have.  GARDASIL 9 may not fully protect each person who gets it.    Who should not get GARDASIL 9?    Anyone with an allergic reaction to:  ? A previous dose of GARDASIL 9  ? A previous dose of GARDASIL®  ? Yeast (severe allergic reaction)  ? Amorphous aluminum hydroxyphosphate sulfate  ? Polysorbate 80    What should I tell the health care professional before getting GARDASIL 9?    Tell the health care professional if you or your child (the person getting GARDASIL 9):  ? Are pregnant or planning to get pregnant.  ? Have immune problems, like HIV or cancer.  ? Take medicines that affect the immune system.  ? Have a fever over 100°F (37.8°C).  ? Might have had an allergic reaction to a previous dose of GARDASIL 9 or GARDASIL.  ? Take any medicines, even those you can buy over the counter.  The health care professional will help decide if you or your  child should get the vaccine.    How is GARDASIL 9 given?    GARDASIL 9 is a shot that is usually given in the arm muscle. GARDASIL 9 may be given as 2 or 3  shots.  For persons who are   9 through 14 years old   2-shots* Dose 1: first shot  Dose 2: second shot given between 6 and 12 months  after the first shot    or 3-shots** Dose 1: first shot  Dose 2: second shot given 2 months after the first shot  Dose 3: third shot given 6 months after the first shot    15 through 45 years old 3-shots   Dose 1: first shot  Dose 2: second shot given 2 months after the first shot  Dose 3: third shot given 6 months after the first shot    *If the second shot is given earlier than 5 months after the first shot, you will need to get a third shot at  least 4 months after the second shot was given.    **The need to use a 3-dose schedule instead of a 2-dose schedule will be determined by your health care  Professional.    Make sure that you or your child gets all doses recommended by your health care professional so that  you or your child gets the best protection. If the person getting GARDASIL 9 misses a dose, tell the health  care professional and they will decide when to give the missed dose. It is important that you follow the  instructions of your health care professional regarding return visits for the follow-up doses.  Fainting can happen after getting an HPV vaccine. Sometimes people who faint can fall and hurt  themselves. For this reason, the health care professional may ask the person getting GARDASIL 9 to sit  or lie down for 15 minutes after getting the vaccine. Some people who faint might shake or become stiff.  The health care professional may need to treat the person getting GARDASIL 9.    Can I get GARDASIL 9 if I have already gotten GARDASIL?    If you have already gotten GARDASIL, talk to your health care professional to see if GARDASIL 9 is right  for you.    Can I get GARDASIL 9 with other vaccines?    GARDASIL  9 can be given at the same time as:  ? Menactra [Meningococcal (Groups A, C, Y and W-135) Polysaccharide Diphtheria Toxoid  Conjugate Vaccine]  ? Adacel [Tetanus Toxoid, Reduced Diphtheria Toxoid and Acellular Pertussis Vaccine Adsorbed  (Tdap)]    What are the possible side effects of GARDASIL 9?    The most common side effects seen with GARDASIL 9 are:  ? pain, swelling, redness, itching, bruising, bleeding, and a lump where you got the shot  ? headache  ? fever  ? nausea  ? dizziness  ? tiredness  ? diarrhea  ? abdominal pain  ? sore throat    Studies show that there was more swelling where the shot was given when GARDASIL 9 was given at  the same time as Menactra and/or Adacel.    Tell the health care professional if you have any of these problems because these may be signs of an  allergic reaction:  ? difficulty breathing  ? wheezing (bronchospasm)  ? hives  ? rash    Additional side effects that have been reported during general use for GARDASIL 9 are shown below.  Side effects reported during the general use of GARDASIL are also shown below. GARDASIL side effects  are reported as they may be relevant to GARDASIL 9 since the vaccines are similar in composition.  GARDASIL 9  ? vomiting  ? hives    Additionally, these side effects have been seen with the general use of GARDASIL.  ? swollen glands (neck, armpit, or groin)  ? joint pain  ? unusual tiredness, weakness, or confusion  ? chills  ? generally feeling unwell  ? leg pain  ? shortness of breath  ? chest pain  ? aching muscles  ? muscle weakness  ? seizure  ? bad stomach ache  ? bleeding or bruising more easily than normal  ? skin infection    You should contact your health care professional right away if you get any symptoms that bother you.  For a more complete list of side effects, ask the health care professional.    Call your health care professional for medical advice about side effects. You may also report any side  effects to your doctor or directly  to Vaccine Adverse Event Reporting System (VAERS). The VAERS tollfree number is 1-734.911.9902 or report online to www.[email or web address].gov.    GARDASIL 9 was not studied in women who knew they were pregnant. A pregnancy registry is available.  You are encouraged to contact the registry as soon as you become aware of your pregnancy by calling 1- [phone], or ask your health care professional to contact the registry for you.    What is in GARDASIL 9?    GARDASIL 9 contains:  ? Proteins of HPV Types 6, 11, 16, 18, 31, 33, 45, 52, and 58  ? Amorphous aluminum hydroxyphosphate sulfate  ? Yeast protein  ? Sodium chloride  ? L-histidine  ? Polysorbate 80  ? Sodium borate  ? Water    This document is a summary of information about GARDASIL 9.    To learn more about GARDASIL 9, please talk to the health care professional or visit  [email or web address].    For patent information: [email or web address]  The trademarks depicted herein are owned by their respective companies.  Copyright © 0931-8310 [employer] a subsidiary of [employer] Inc.  All rights reserved.  Revised: 06/2020  [id number]
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
## 2024-08-16 NOTE — PROGRESS NOTES
Diagnosis:   Neck sprain , initial encounter , Left hip pain , acute pain of left shoulder      Referring Provider: Shelli Mcdermott  Date of Evaluation:    6/21/2024    Precautions:  Latex Allergy, allergy to Peanuts and Tree Nuts , Carries Epi Pen Next MD visit:   none scheduled  Date of Surgery: n/a   Insurance Primary/Secondary: [employer] [place] HMO / N/A     # Auth Visits: 12            Subjective: \" My neck neck feels 60 % better overall since the start of PT and my left shoulder feels much better to but I develop pain in my right wrist pain 8/10 than is constant and it is getting worse \".    Pain: 4/10 ( left side of neck and left shoulder )      Objective:       Assessment: added wall slides to further facilitate L shoulder AROM and added light resistance to strength ex's performed in supine position to further challenge functional strength with OH activities . Patient was able to complete all given ex's without report of increase in left shoulder pain .      Goals: ( to be met in 12 visits ) with additional Goals to be determined for L hip upon examination at subsequent session.  1.Pt will report ability to sleep without walking due to shoulder pain .  2.Pt will report overall pain at worst 4/10 for improved ability to tolerate sitting on toilet & standing from sitting .  3.Pt will improve cervical AROM flexion to 50 degrees to improve tolerance for bending down to  items from the floor .  4.Pt will improve bilateral cervical AROM rotation to 70 degrees to improve tolerance for turning head to check bind spot while driving & for conversation with family .  5.Pt will improve shoulder flexion AROM to 150 degrees for washing hair & dressing .  6.PT will be independent and compliant with comprehensive HEP to maintain progress achieved in PT .    Plan: continue PT and progress patient towards current goals as able .  Date: 7/10/2024  TX#: 2/12 Date:  8/16/24               TX#: 3/12 Date:                 TX#: 4/  Date:                 TX#: 5/ Date:   Tx#: 6/   Manual Therapy x 10 min :  STM to B cervical paraspinals and L UT  Manual Therapy x 15 min :  STM to B cervical paraspinals and L UT   Manual cervical retraction x 5 min         Therapeutic Ex x 30 min :  Supine :  Cervical retraction AROM 2 x 10   Cervical retraction with self OP 2 x 10   PROM of L shoulder with gentle end range stretch in all planes x 7 min   AAROM with cane assist :  Shoulder flexion 2 x 10   Chest press 2 x 10   ER 2 x 10   Seated :  Cervical retraction AROM 2 x 10   Cervical retraction with self OP 2 x 10   Cervical AROM of R/L SB 2 x 10   Cervical AROM of cervical rotation 2 x 10   Standing ;  B rows with YTB x 10  B sh ext with YTB x 10   L ER/ IR with YTB x 10 Therapeutic Ex x 30 min :  Supine :  Cervical retraction AROM 2 X 10  Cervical retraction with self OP 2 x 10   PROM L sh with gentle end range strength in all planes x 5 min   L sh chest press with 1 lbs 2 x 10  L sh flex with 1 lbs   2 x 10   Side lying :  L scaption to lillian with 1 lbs 2 x 10   L sh ER with 1 lbs   2 x 10   Standing :  L shoulder wall slides on wall with towel :  Flex x 10  Scaption x 10   Circles x 10 reps each   Diagonals x 10 reps each   L sh rows with YTB   2 x 10   L sh ext with YTB   2 x 10   L sh flex with YTB   2 x 10  Seated :  Cervical retraction AROM 2 x 10   Cervical AROM with self OP 2 x 10                     HEP:   ( 7/10/24 ) supine : cervical retraction x 10 , seated : cervical retraction AROM X 10 , cervical retraction with self OP x 10 , scapula retraction x 10 , rows with YTB x 10 , B sh ext with YTB X 10 to be performed 1-2 times a day .    Charges: manual there x 1 , therapeutic ex x 2        Total Timed Treatment: 40 min  Total Treatment Time: 45 min

## 2024-08-26 NOTE — PATIENT INSTRUCTIONS
Access Code: [email or web address]  URL: [email or web address]  Date: 08/26/2024  Prepared by: Melody De La Garza    Exercises  - Seated Isometric Cervical Rotation  - 1-2 x daily - 1 sets - 5 reps - 5 sec hold

## 2024-08-26 NOTE — PROGRESS NOTES
Diagnosis:   Neck sprain, initial encounter (S13.9XXA)  Left hip pain (M25.552)  Acute pain of left shoulder (M25.512)   Referring Provider: Shelli Mcdermott  Date of Evaluation:    6/21/2024    Precautions:   Latex Allergy, Allergy to Peanuts & Tree Nuts: Carries Epi Pen , allergy to Peanuts and Tree Nuts , Carries Epi Pen Next MD visit:   Physical 9/9  Date of Surgery: n/a   Insurance Primary/Secondary: [facility] HMO / N/A     # Auth Visits: 12            Subjective: Pt reports a couple of hours ago, was stretching- was on the couch & felt tense so was leaning back, not sure if made a sudden movement or what but felt a pinch to the right upper shld region & couldn't move the neck well so massaged the area & was able to regain some movement. \"Had been doing better with this part of my body.\" Currently is better, but soreness is still noted. Turning to L is better than turning to R.     Pain: at rest 3/10, with movement 6/10      Objective: See Flowsheet for details  8/26/2024: Pt wearing R wrist soft brace/ splint. Cervical AROM Pre: grossly shyam restricted with R rotation with \"pinch\", mod restricted L rotation. B first rib elevation with hypomob depression. Cervical AROM Post: grossly mod restricted, no pinch but \"pull'      Assessment: Relief obtained with active cervical AROM rotation post manual treatment.      Goals: (to be met in 12 visits) With additional Goals to be determined for L hip upon examination at subsequent session  Pt will report improved ability to sleep without waking due to shoulder pain  Pt will report overall pain at worst 4/10 for improved ability to tolerate sitting on toilet & standing from sitting  Pt will improve cervical AROM flexion to 50 degrees to improve tolerance for bending down to pick items from the floor  Pt will improve bilateral cervical AROM rotation to 70 degrees to improve tolerance for turning head to check blind spot while driving & for conversations with family  Pt will  improve shoulder flexion AROM to 150 degrees for washing hair & dressing  Pt will be independent and compliant with comprehensive HEP to maintain progress achieved in PT    Plan: continue PT and progress patient towards current goals as able  Date: 7/10/2024  TX#: 2/12 Date:  8/16/24               TX#: 3/12 Date: 8/26/2024  TX#: 4/12 Date:                 TX#: 5/ Date:   Tx#: 6/   Manual Therapy x 10 min :  STM to B cervical paraspinals and L UT  Manual Therapy x 15 min :  STM to B cervical paraspinals and L UT   Manual cervical retraction x 5 min    Manual Therapy: 15'  R first rib depression gr I-III in supine     Therapeutic Ex x 30 min :  Supine :  Cervical retraction AROM 2 x 10   Cervical retraction with self OP 2 x 10   PROM of L shoulder with gentle end range stretch in all planes x 7 min   AAROM with cane assist :  Shoulder flexion 2 x 10   Chest press 2 x 10   ER 2 x 10   Seated :  Cervical retraction AROM 2 x 10   Cervical retraction with self OP 2 x 10   Cervical AROM of R/L SB 2 x 10   Cervical AROM of cervical rotation 2 x 10   Standing ;  B rows with YTB x 10  B sh ext with YTB x 10   L ER/ IR with YTB x 10 Therapeutic Ex x 30 min :  Supine :  Cervical retraction AROM 2 X 10  Cervical retraction with self OP 2 x 10   PROM L sh with gentle end range strength in all planes x 5 min   L sh chest press with 1 lbs 2 x 10  L sh flex with 1 lbs   2 x 10   Side lying :  L scaption to lillian with 1 lbs 2 x 10   L sh ER with 1 lbs   2 x 10   Standing :  L shoulder wall slides on wall with towel :  Flex x 10  Scaption x 10   Circles x 10 reps each   Diagonals x 10 reps each   L sh rows with YTB   2 x 10   L sh ext with YTB   2 x 10   L sh flex with YTB   2 x 10  Seated :  Cervical retraction AROM 2 x 10   Cervical AROM with self OP 2 x 10  Therex: 25'  General education: hand/ wrist neutral posture while holding baby  Education: overall goals for strengthening. Avoid forcing neck movement if symptoms recur. Gentle  movement  Instruction in & practice self-cervical submax isometrics (for R rotation however pt may try in other positions if need be for home)  1/2 FR stretch supine with folded towel at sacrum X 5'                   HEP:   ( 7/10/24 ) supine : cervical retraction x 10 , seated : cervical retraction AROM X 10 , cervical retraction with self OP x 10 , scapula retraction x 10 , rows with YTB x 10 , B sh ext with YTB X 10 to be performed 1-2 times a day .  Access Code: [email or web address]  URL: [email or web address]  Date: 08/26/2024  Prepared by: Melody De La Garza    Exercises  - Seated Isometric Cervical Rotation  - 1-2 x daily - 1 sets - 5 reps - 5 sec hold    Charges: manual there x 1 , therapeutic ex x 2        Total Timed Treatment: 40 min  Total Treatment Time: 40 min

## 2024-08-30 NOTE — PATIENT INSTRUCTIONS
Access Code: [email or web address]  URL: [email or web address]  Date: 08/30/2024  Prepared by: Melody De La Garza    Exercises  - Seated Scapular Retraction  - 1 x daily - 7 x weekly - 1-2 sets - 10 reps - 2 sec hold

## 2024-08-30 NOTE — PROGRESS NOTES
Diagnosis:   Neck sprain, initial encounter (S13.9XXA)  Left hip pain (M25.552)  Acute pain of left shoulder (M25.512)   Referring Provider: Shelli Mcdermott  Date of Evaluation:    6/21/2024    Precautions:   Latex Allergy, Allergy to Peanuts & Tree Nuts: Carries Epi Pen Next MD visit:   Physical 9/9  Date of Surgery: n/a   Insurance Primary/Secondary: [employer] [place] HMO / N/A     # Auth Visits: 5            Subjective: Pt reports she did the neck exercise after getting the same pain again- much better with this. Acknowledges she does a lot of bending & looking down.     Pain: 6-7/10      Objective: See Flowsheet for details  8/26/2024: Pt wearing R wrist soft brace/ splint. Cervical AROM Pre: grossly shyam restricted with R rotation with \"pinch\", mod restricted L rotation. B first rib elevation with hypomob depression. Cervical AROM Post: grossly mod restricted, no pinch but \"pull'      Assessment: Manual cues to facilitate proper form with scap retraction without substitution from upper neck mm. Able to complete with proper form with cues.      Goals: (to be met in 12 visits) With additional Goals to be determined for L hip upon examination at subsequent session  Pt will report improved ability to sleep without waking due to shoulder pain  Pt will report overall pain at worst 4/10 for improved ability to tolerate sitting on toilet & standing from sitting  Pt will improve cervical AROM flexion to 50 degrees to improve tolerance for bending down to pick items from the floor  Pt will improve bilateral cervical AROM rotation to 70 degrees to improve tolerance for turning head to check blind spot while driving & for conversations with family  Pt will improve shoulder flexion AROM to 150 degrees for washing hair & dressing  Pt will be independent and compliant with comprehensive HEP to maintain progress achieved in PT    Plan: Pt to bring in her purchased neck device to confirm appropriate to use - defer use until cleared by PT/  PTA  Auth request sent for more visits to be approved  Date: 7/10/2024  TX#: 2/12 Date:  8/16/24               TX#: 3/12 Date: 8/26/2024  TX#: 4/12 Date: 8/30/2024  TX#: 5/12 Date:   Tx#: 6/   Manual Therapy x 10 min :  STM to B cervical paraspinals and L UT  Manual Therapy x 15 min :  STM to B cervical paraspinals and L UT   Manual cervical retraction x 5 min    Manual Therapy: 15'  R first rib depression gr I-III in supine     Therapeutic Ex x 30 min :  Supine :  Cervical retraction AROM 2 x 10   Cervical retraction with self OP 2 x 10   PROM of L shoulder with gentle end range stretch in all planes x 7 min   AAROM with cane assist :  Shoulder flexion 2 x 10   Chest press 2 x 10   ER 2 x 10   Seated :  Cervical retraction AROM 2 x 10   Cervical retraction with self OP 2 x 10   Cervical AROM of R/L SB 2 x 10   Cervical AROM of cervical rotation 2 x 10   Standing ;  B rows with YTB x 10  B sh ext with YTB x 10   L ER/ IR with YTB x 10 Therapeutic Ex x 30 min :  Supine :  Cervical retraction AROM 2 X 10  Cervical retraction with self OP 2 x 10   PROM L sh with gentle end range strength in all planes x 5 min   L sh chest press with 1 lbs 2 x 10  L sh flex with 1 lbs   2 x 10   Side lying :  L scaption to lillian with 1 lbs 2 x 10   L sh ER with 1 lbs   2 x 10   Standing :  L shoulder wall slides on wall with towel :  Flex x 10  Scaption x 10   Circles x 10 reps each   Diagonals x 10 reps each   L sh rows with YTB   2 x 10   L sh ext with YTB   2 x 10   L sh flex with YTB   2 x 10  Seated :  Cervical retraction AROM 2 x 10   Cervical AROM with self OP 2 x 10  Therex: 25'  General education: hand/ wrist neutral posture while holding baby  Education: overall goals for strengthening. Avoid forcing neck movement if symptoms recur. Gentle movement  Instruction in & practice self-cervical submax isometrics (for R rotation however pt may try in other positions if need be for home)  1/2 FR stretch supine with folded towel at  sacrum X 5' Therex: 42'  HEP: ways to better find time at home. Small moments count. Post-it note reminders. Ideally 10-15 min but can be 5 min if able   -foam roll stretch  Posture checks - feeding position for baby for better body positioning; standing posture/ holding posture  -lumbar roll  Seated cervical retractions - or even standing if need be  1/2 FR stretch supine X 5' - recommend for HEP  -claps X 10 B  -swims X 10 B  -angels X 10 B  Seated scap retractions X 10 - HEP  Doorway pec stretch 4 X 30\" B  Standing :  Shoulder wall slides on wall with towel :  Flex x 10 L  Scaption x 10 L  Circles x 10 reps each (deferred on the R when pt reported increased R wrist discomfort with attempt)  Diagonals x 10 reps L                  HEP:   ( 7/10/24 ) supine : cervical retraction x 10 , seated : cervical retraction AROM X 10 , cervical retraction with self OP x 10 , scapula retraction x 10 , rows with YTB x 10 , B sh ext with YTB X 10 to be performed 1-2 times a day .  Access Code: [email or web address]  URL: [email or web address]  Date: 08/26/2024  Prepared by: Melody De La Garza    Exercises  - Seated Isometric Cervical Rotation  - 1-2 x daily - 1 sets - 5 reps - 5 sec hold    Access Code: [email or web address]  URL: [email or web address]  Date: 08/30/2024  Prepared by: Melody De La Garza    Exercises  - Seated Scapular Retraction  - 1 x daily - 7 x weekly - 1-2 sets - 10 reps - 2 sec hold    Charges: Therex X 3        Total Timed Treatment: 42 min  Total Treatment Time: 42 min
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
## 2025-01-22 NOTE — PROGRESS NOTES
Iron studies - stable  Vit b12 - within normal range  Vit D - borderline, recommending taking a multivitamin that contains vitamin d or add vit d3 1000iu daily

## 2025-01-23 NOTE — PROGRESS NOTES
Reviewed results and recommendations with patient. Patient agrees to repeat CMP in 2-4 weeks and follow up with PCP.

## 2025-03-14 NOTE — TELEPHONE ENCOUNTER
Patient scheduled with Dr Casiano for anna wrist pain. Please advise if imaging is needed  Future Appointments   Date Time Provider Department Center   4/17/2025 11:30 AM Moise Casiano MD [facility] [address]
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
## 2025-06-09 NOTE — TELEPHONE ENCOUNTER
Spoke with patient - has had lightheaded episodes since \"before my last child was born over a year ago\"    Denies \"spinning dizzy\" - more lightheaded  Had worn a holter monitor which was normal.    Last episode lasted 4 hours.    Instructed patient, if symptoms happen again and last that long, needs to be seen in an ER.    Patient verbalized understanding.

## 2025-06-09 NOTE — TELEPHONE ENCOUNTER
Patient scheduled appointment on my chart for Dizziness   Sending to triage   ok to wait till appointment   Future Appointments   Date Time Provider Department Center   6/23/2025  8:40 AM Shelli Mcdermott MD [facility] [place]   6/27/2025 11:40 AM Moise Casiano MD [facility] [address]
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
## 2025-06-24 NOTE — PROGRESS NOTES
Clinic Note     Allergies[1]    CC: Right wrist pain    History of Present Illness    HPI:     From prior visit 4/17/25:  This 39 year old RHD female presents with Right radial sided wrist pain. The pain originates at the base of her thumb and extends midway up her arm, worsening with use, particularly after having her baby due to frequent hand use for caregiving tasks. She has been using a brace, but it has not provided significant relief and sometimes exacerbates the pain.      Today's visit 6/24/25:  Olesya Monae is a 39 year old female who returns for follow up.    She experiences significant improvement in her wrist pain after the injection, although some discomfort persists over the radial aspect of the right wrist.    She finds it challenging to wear a brace consistently due to her responsibilities with a baby, such as changing and feeding, which require her to remove the brace frequently. Wearing the brace at night causes irritation and discomfort.    She manages her symptoms with the brace and the injection, noting that the pain is not as severe as it was prior to the injection.    Review of Systems:  Negative unless stated in HPI.      Physical Exam:      Constitutional: NAD. AOx3. Well-developed and Well-nourished.   Psychiatric: Normal mood/ affect/ behavior. Judgment and thought content normal.     Right Upper Extremity:   Inspection: Skin Intact. No skin lesions. No gross deformity. Swelling at the first dorsal compartment.   Palpation: TTP over first dorsal compartment but significantly improved from prior. No tenderness to palpation over 1st CMC joint. No pain elicited with axial loading of the 1st CMC joint. No tenderness of the A1 pulley of the thumb.   Motion: Elbow: normal bilateral symmetric ext/flex  Wrist: normal bilateral symmetric ext/flex/sup/pro  Fingers: able to make a full fist   Neurovascular Normal perfused hand with brisk capillary refill in all digits. Sensation is intact to  light touch in the median, ulnar, and radial sensory nerve distribution.  Motor function is intact to AIN, PIN, and ulnar motor nerve.     Special Tests:  negative Finkelstein's test.  Wrist extension and flexion is symmetrically normal.      Assessment/Plan:  39 year old female with Right radial-sided wrist pain.    I reviewed the patient's electronic medical record, including the pertinent office notes, medical/surgical history, medications and images. I specifically reviewed the images noted above, independently and discussed my independent interpretation of these images in combination with the pertinent positive and negative findings in my clinical exam with the patient.    Today I again discussed with the patient the pathophysiology and pathoanatomy of DeQuervain's tenosynovitis as well as the treatment options. Non-operative modalities include continued rest, NSAIDs, and immobilization in thumb spica braces/splints. Indications for procedural treatments including corticosteroid injections were discussed, as were indications for surgical treatment. I reviewed the risks, alternatives, and expected outcomes of these treatment options. Education and counseling was given for the above diagnosis.    Right deQuervain's tenosynovitis - Surgical and non-surgical treatment options were reviewed with the patient. Patient improved after CSI#1 at prior visit, but some discomfort persists. She would like to proceed with an OT referral.   OT referral provided today.       Follow Up: 6-8 weeks should symptoms not resolve fully    Moise Casiano MD   Hand, Wrist, & Elbow Surgery  [email or web address]         [1]   Allergies  Allergen Reactions    Latex RASH and SWELLING    Peanuts HIVES    Tree Nuts HIVES

## 2025-06-26 NOTE — TELEPHONE ENCOUNTER
Me to Olesya HUITRON      6/26/25  1:03 PM  Dr Sharron Wolfe said she also wants you to get an echocardiogram and carotid ultrasound done. She wants to get this done prior to your clearance for your EGD and colonoscopy. She placed these orders for you. To schedule these tests please call Central Scheduling at [phone].  -Julianna GUILLEN, RN  [facility] Office Phone: [phone]    Last read by Olesya Monae at 1:04PM on 6/26/2025.    6/26/25 12:53 PM  Shelli Mcdermott MD routed this conversation to [facility] Clinical Staff  Shelli Mcdermott MD      6/26/25 12:53 PM  Note      I want her to do echo and also carotid ultrasound. Also regarding her preope for colonoscopy egd I want her to get this testing done first and then decide whether she can do procedure or not.

## 2025-06-26 NOTE — TELEPHONE ENCOUNTER
I want her to do echo and also carotid ultrasound. Also regarding her preope for colonoscopy egd I want her to get this testing done first and then decide whether she can do procedure or not.

## 2025-06-26 NOTE — TELEPHONE ENCOUNTER
Patient needs medical clearance for EGD and colonoscopy  Not yet scheduled with [facility]  Last office visit 6/23/25 for dizziness  Schedule another office visit?    ,  Future Appointments   Date Time Provider Department Center   8/26/2025  1:00 PM Yasmin Celaya MD EN[facility]APER [facility] [place]

## 2025-07-21 NOTE — PROGRESS NOTES
Left message to call  Sent my chart message    \"Results reviewed. Please inform patient schedule a vv with me discuss result of test. Schedule her tomorrow you can double bk me\".
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
## 2025-07-23 NOTE — TELEPHONE ENCOUNTER
Patient due for mammogram  MCM sent to patient  Future Appointments   Date Time Provider Department Center   8/11/2025  3:00 PM Abiel Swenson MD [facility]   8/22/2025 10:40 AM Moise Casiano MD [facility] ORTHOPL [facility] [address]   8/26/2025  1:00 PM [facility]

## 2025-07-24 NOTE — TELEPHONE ENCOUNTER
Faxed form back to [facility] with info below  Fax: [phone]    Form in nurse pending folder

## 2025-07-24 NOTE — TELEPHONE ENCOUNTER
She is suppose to see vascular and neuro before clearance. Patient is aware about it I just talk to her yesterday. She is schedule with Dr. Swenson and Neuro in August. Can you let gi office know about it.

## 2025-07-24 NOTE — TELEPHONE ENCOUNTER
Received fax from [facility]  Patient having colonoscopy and EGD  Date: TBD  Need medical clearance to proceed with anesthesia.  Last office visit 6/23/25  Form in Dr Sharron tena  Please advise

## (undated) NOTE — LETTER
AUTHORIZATION FOR SURGICAL OPERATION OR OTHER PROCEDURE    1. I hereby authorize Dr.Krishna Casiano, and [facility] staff assigned to my case to perform the following operation and/or procedure at the [facility] site:    _______________________________________________________________________________________________    Right wrist cortisone injection   _______________________________________________________________________________________________    2.  My physician has explained the nature and purpose of the operation or other procedure, possible alternative methods of treatment, the risks involved, and the possibility of complication to me.  I acknowledge that no guarantee has been made as to the result that may be obtained.  3.  I recognize that, during the course of this operation, or other procedure, unforseen conditions may necessitate additional or different procedure than those listed above.  I, therefore, further authorize and request that the above named physician, his/her physician assistants or designees perform such procedures as are, in his/her professional opinion, necessary and desirable.  4.  Any tissue or organs removed in the operation or other procedure may be disposed of by and at the discretion of the [facility] and [facility].  5.  I understand that in the event of a medical emergency, I will be transported by local paramedics to [facility] or other hospital emergency department.  6.  I certify that I have read and fully understand the above consent to operation and/or other procedure.    7.  I acknowledge that my physician has explained sedation/analgesia administration to me including the risks and benefits.  I consent to the administration of sedation/analgesia as may be necessary or desirable in the judgement of my physician.    Witness signature: ___________________________________________________ Date:   ______/______/_____                    Time:  ________ A.M.  P.M.       Patient Name:  ______________________________________________________  (please print)      Patient signature:  ___________________________________________________             Relationship to Patient:           []  Parent    Responsible person                          []  Spouse  In case of minor or                    [] Other  _____________   Incompetent name:  __________________________________________________                               (please print)      _____________      Responsible person  In case of minor or  Incompetent signature:  _______________________________________________    Statement of Physician  My signature below affirms that prior to the time of the procedure, I have explained to the patient and/or his/her guardian, the risks and benefits involved in the proposed treatment and any reasonable alternative to the proposed treatment.  I have also explained the risks and benefits involved in the refusal of the proposed treatment and have answered the patient's questions.                        Date:  ______/______/_______  Provider                      Signature:  __________________________________________________________       Time:  ___________ A.M    P.M.

## (undated) NOTE — MR AVS SNAPSHOT
[address]  [facility] [address]  [phone]                    After Visit Summary   5/17/2024    Olesya Monae   MRN: [id number]           Visit Information     Date & Time  5/17/2024  1:00 PM Provider  JUANY COPELAND RN5 [facility] [place] Dept. Phone  [phone]      Your Vital Signs Were     Last Period   09/11/2023 (Exact Date)    Smoking Status   Never            Allergies as of 5/17/2024  Review status set to Review Complete on 5/6/2024       Noted Reaction Type Reactions    Latex 02/19/2019    RASH, SWELLING    Peanuts 02/05/2021    HIVES    Tree Nuts 02/05/2021    HIVES      Your Current Medications        Dosage    Ferrous Sulfate 325 (65 Fe) MG Oral Tab 1 tablet PO every other day at least 4 hours separate from other iron supplement (e.g. prenatal vitamin)    cyanocobalamin 1000 MCG Oral Tab Take 1 tablet (1,000 mcg total) by mouth daily.    Magnesium 500 MG Oral Tab Take by mouth.    Prenatal MV-Min-Fe Fum-FA-DHA (PRENATAL 1 OR) Take by mouth.    metoclopramide (REGLAN) 10 MG Oral Tab Take 1 tablet (10 mg total) by mouth every 6 (six) hours as needed.      Future Appointments        Provider Department    5/22/2024 11:15 AM Courtney Cohen [facility] - OB/GYN    5/23/2024 4:30 PM [facility] [place]    5/30/2024 9:30 AM [facility] [place]    5/30/2024 11:00 AM Gladys Martinez [facility] - OB/GYN    6/6/2024 9:30 AM [facility] [place]    6/6/2024 12:30 PM Val Sam [facility] - OB/GYN    6/10/2024 7:30 PM L & D ROOM A [facility]    6/11/2024 7:30 AM L & D ROOM A [facility]    6/13/2024 9:30 AM [facility] [place]     7/30/2024 3:20 PM Shelli Mcdermott [facility], [place] [address], [place]      To Do List     Wednesday May 22, 2024 11:15 AM   Appointment with Courtney Cohen at [facility] - OB/GYN ([phone])      [address]  [facility] [address]      Thursday May 23, 2024 4:30 PM   Appointment with Ashanti Parker at [facility] [place] ([phone])      [address]  [facility] [address]      Thursday May 30, 2024 9:30 AM   Appointment with Ashanti Parker at [facility] [place] ([phone])      [address]  [facility] [address]      Thursday May 30, 2024 11:00 AM   Appointment with Gladys Martinez at [facility] - OB/GYN ([phone])      [address]  [facility] [address]      Thursday June 06, 2024 9:30 AM   Appointment with [facility] at [facility] [place] ([phone])      [address]  [facility] [address]      Thursday June 06, 2024 12:30 PM   Appointment with Val Sam at [facility] - OB/GYN ([phone])      [address]  [facility] [address]      Monday Catia 10, 2024 7:30 PM   Appointment with L & D ROOM A at [facility] ([phone])      [address] [facility] [address]      Tuesday June 11, 2024 7:30 AM   Appointment with L & D ROOM A at [facility] ([phone])      [address] [facility] [address]      Thursday June 13, 2024 9:30 AM   Appointment with Ashanti Parker at [facility] [place] ([phone])      [address] [facility] [address]  [facility] [address]      Tuesday July 30, 2024 3:20 PM   Appointment with Shelli Mcdermott at [address] ([phone])  PLEASE NOTE - Most  insurances allow a Complete Physical once every 366 days. Please schedule accordingly.    Please arrive 15 minutes prior to your scheduled appointment. Please also bring your Insurance card, Photo ID, and your medication bottles or a list of your current medication.    If you no longer require this appointment, please contact your physician office to cancel.      [address] [address]        [facility]    Visit [employer]  You can access your MyChart to more actively manage your health care and view more details from this visit by going to [email or web address].  If you've recently had a stay at the Hospital you can access your discharge instructions in [facility] by going to Visits < Admission Summaries. If you've been to the Emergency Department or your doctor's office, you can view your past visit information in [facility] by going to Visits < Visit Summaries.   [facility] questions?  Call [phone] for help.  [facility] is NOT to be used for urgent needs.    For medical emergencies, dial 911.